# Patient Record
Sex: MALE | URBAN - METROPOLITAN AREA
[De-identification: names, ages, dates, MRNs, and addresses within clinical notes are randomized per-mention and may not be internally consistent; named-entity substitution may affect disease eponyms.]

---

## 2018-02-08 ENCOUNTER — OFFICE VISIT (OUTPATIENT)
Dept: INTERNAL MEDICINE | Facility: CLINIC | Age: 28
End: 2018-02-08
Payer: COMMERCIAL

## 2018-02-08 VITALS
SYSTOLIC BLOOD PRESSURE: 134 MMHG | RESPIRATION RATE: 18 BRPM | WEIGHT: 251.4 LBS | TEMPERATURE: 97.7 F | DIASTOLIC BLOOD PRESSURE: 82 MMHG | HEIGHT: 72 IN | BODY MASS INDEX: 34.05 KG/M2 | HEART RATE: 74 BPM | OXYGEN SATURATION: 99 %

## 2018-02-08 DIAGNOSIS — Z00.00 ROUTINE ADULT HEALTH MAINTENANCE: ICD-10-CM

## 2018-02-08 DIAGNOSIS — Z00.00 ROUTINE ADULT HEALTH MAINTENANCE: Primary | ICD-10-CM

## 2018-02-08 DIAGNOSIS — Z78.9 NO IMMUNIZATION HISTORY RECORD: ICD-10-CM

## 2018-02-08 LAB
ANTIBODY: NORMAL
HBV SURFACE AB SERPL IA-ACNC: >1000 M[IU]/ML

## 2018-02-08 NOTE — PROGRESS NOTES
PRIMARY CARE CENTER         HPI:       Aristides Brown is a 27 year old male who presents for the following  Patient presents with: Establish Care (Patient is here to establish a new PCP. ) and Physical (Patient is here for annual physical. )    Patient was just accepted to medical school, and is here for a physical exam and to review immunization history. He has no acute complaints today. 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 He is hoping to pursue either family medicine or general surgery.     He is getting regular exercise and eating a balanced diet, though admits this is sometimes challenging while in school. He denies any joint or muscle issues, chest pain, SOB, abdominal pain, n/v/d, constipation, fevers, or weight loss.         Health Maintenance Due   Topic Date Due     TETANUS IMMUNIZATION (SYSTEM ASSIGNED)  03/04/2009       Problem, Medication and Allergy Lists were   reviewed and are current.   There are no active problems to display for this patient.        No current outpatient prescriptions on file.       No Known Allergies  Patient is   a new patient to this clinic and so  I reviewed/updated the Past Medical History, the Family History and the Social History. , No past medical history on file.,   Family History     Problem (# of Occurrences) Relation (Name,Age of Onset)    CANCER (1) Paternal Grandfather    Hypertension (1) Father       and   Social History     Social History     Marital status:      Spouse name: N/A     Number of children: N/A     Years of education: N/A     Occupational History     Medical Student      [employer]     Social History Main Topics     Smoking status: Never Smoker     Smokeless tobacco: Never Used     Alcohol use 2.4 - 3.6 oz/week     4 - 6 Standard drinks or equivalent per week     Drug use: No     Sexual activity: Yes     Partners: Female     Birth control/ protection: Pill      Other Topics Concern     Not on file     Social History Narrative     No narrative on file            Review of Systems:   Review of Systems     Constitutional:  Negative for fever, chills, weight loss, weight gain, fatigue, decreased appetite, night sweats, recent stressors, height gain, height loss, post-operative complications, incisional pain, hallucinations, increased energy, hyperactivity and confused.   HENT:  Negative for ear pain, hearing loss, tinnitus, nosebleeds, trouble swallowing, hoarse voice, mouth sores, sore throat, ear discharge, tooth pain, gum tenderness, taste disturbance, smell disturbance, hearing aid, bleeding gums, dry mouth, sinus pain, sinus congestion and neck mass.    Eyes:  Negative for double vision, pain, redness, eye pain, decreased vision, eye watering, eye bulging, eye dryness, flashing lights, spots, floaters, strabismus, tunnel vision, jaundice and eye irritation.   Respiratory:   Negative for cough, hemoptysis, sputum production, shortness of breath, wheezing, sleep disturbances due to breathing, snores loudly, respiratory pain, dyspnea on exertion, cough disturbing sleep and postural dyspnea.    Cardiovascular:  Negative for chest pain, dyspnea on exertion, palpitations, orthopnea, claudication, leg swelling, fingers/toes turn blue, hypertension, hypotension, syncope, history of heart murmur, chest pain on exertion, chest pain at rest, pacemaker, few scattered varicosities, leg pain, sleep disturbances due to breathing, tachycardia, light-headedness, exercise intolerance and edema.   Gastrointestinal:  Negative for heartburn, nausea, vomiting, abdominal pain, diarrhea, constipation, blood in stool, melena, rectal pain, bloating, hemorrhoids, bowel incontinence, jaundice, rectal bleeding, coffee ground emesis and change in stool.   Genitourinary:  Negative for bladder incontinence, dysuria, urgency, hematuria, flank pain, difficulty urinating, nocturia, voiding less  "frequently, scrotal pain, ulcerations, penile discharge, male genitourinary complaint and reduced libido.   Musculoskeletal:  Negative for myalgias, back pain, joint swelling, arthralgias, stiffness, muscle cramps, neck pain, bone pain, muscle weakness and fracture.   Skin:  Negative for nail changes, itching, poor wound healing, rash, hair changes, skin changes, acne, warts, poor wound healing, scarring, flaky skin, Raynaud's phenomenon, sensitivity to sunlight and skin thickening.   Neurological:  Negative for dizziness, tingling, tremors, speech change, seizures, loss of consciousness, weakness, light-headedness, numbness, headaches, disturbances in coordination, extremity numbness, memory loss, difficulty walking and paralysis.   Endo/Heme:  Negative for anemia, swollen glands and bruises/bleeds easily.   Psychiatric/Behavioral:  Negative for depression, hallucinations, memory loss, decreased concentration, mood swings and panic attacks.    Endocrine:  Negative for altered temperature regulation, polyphagia, polydipsia, unwanted hair growth and change in facial hair.    I have personally reviewed and updated the complete ROS on the day of the visit.           Physical Exam:   /82  Pulse 74  Temp 97.7  F (36.5  C) (Oral)  Resp 18  Ht 1.836 m (6' 0.28\")  Wt 114 kg (251 lb 6.4 oz)  SpO2 99%  BMI 33.83 kg/m2  Body mass index is 33.83 kg/(m^2).  Vitals were reviewed       GENERAL APPEARANCE: healthy, alert and no distress     EYES: EOMI,  PERRL     HENT: ear canals and TM's normal and nose and mouth without ulcers or lesions     NECK: no adenopathy, no asymmetry, masses, or scars and thyroid normal to palpation     RESP: lungs clear to auscultation - no rales, rhonchi or wheezes     CV: regular rates and rhythm, normal S1 S2, no S3 or S4 and no murmur, click or rub     ABDOMEN:  soft, nontender, no HSM or masses and bowel sounds normal     MS: extremities normal- no gross deformities noted, no evidence " of inflammation in joints, FROM in all extremities.     SKIN: no suspicious lesions or rashes     NEURO: Normal strength and tone, sensory exam grossly normal, mentation intact and speech normal     PSYCH: mentation appears normal. and affect normal/bright        Results:      Results from the last 24 hoursNo results found for this or any previous visit (from the past 24 hour(s)).  Assessment and Plan     Aristides was seen today for establish care and physical.    Diagnoses and all orders for this visit:    Routine adult health maintenance  -     Hepatitis B Surface Antibody; Future  -     M Tuberculosis by Quantiferon; Future  -     Varicella Zoster Virus Antibody IgG; Future  -     Mumps Antibody IgG; Future  -     Rubella Antibody IgG Quantitative; Future  -     Rubeola Antibody IgG; Future  -     TDAP VACCINE (BOOSTRIX)    No immunization history record  -     Hepatitis B Surface Antibody; Future  -     M Tuberculosis by Quantiferon; Future  -     Varicella Zoster Virus Antibody IgG; Future  -     Mumps Antibody IgG; Future  -     Rubella Antibody IgG Quantitative; Future  -     Rubeola Antibody IgG; Future  -     TDAP VACCINE (BOOSTRIX)      Options for treatment and follow-up care were reviewed with the patient. Aristides Brown engaged in the decision making process and verbalized understanding of the options discussed and agreed with the final plan.    Kristi Morris MD  Feb 8, 2018    Plan of care discussed with Dr. Brown.     Attestation:  I, Caty Doshi, saw this patient with the resident and agree with the resident s findings and plan of care as documented in the resident s note.      Caty Doshi MD

## 2018-02-08 NOTE — MR AVS SNAPSHOT
After Visit Summary   2/8/2018    Aristides Brown    MRN: [id number]           Patient Information     Date Of Birth          1990        Visit Information        Provider Department      2/8/2018 10:10 AM Kristi Morris MD [facility]        Today's Diagnoses     Routine adult health maintenance    -  1    No immunization history record          Care Instructions    Primary Care Center: [phone]     Primary Care Center Medication Refill Request Information:  * Please contact your pharmacy regarding ANY request for medication refills.  ** [facility] Prescription Fax = [phone]  * Please allow 3 business days for routine medication refills.  * Please allow 5 business days for controlled substance medication refills.     Primary Care Center Test Result notification information:  *You will be notified with in 7-10 days of your appointment day regarding the results of your test.  If you are on MyChart you will be notified as soon as the provider has reviewed the results and signed off on them.    What we talked about today:  - Labs for vaccine titers today  - Tdap today  - Results will all be available on MyChart          Follow-ups after your visit        Future tests that were ordered for you today     Open Future Orders        Priority Expected Expires Ordered    Hepatitis B Surface Antibody Routine  2/9/2019 2/8/2018    M Tuberculosis by Quantiferon Routine 2/8/2018 2/8/2019 2/8/2018    Varicella Zoster Virus Antibody IgG Routine  2/9/2019 2/8/2018    Mumps Antibody IgG Routine 2/8/2018 2/8/2019 2/8/2018    Rubella Antibody IgG Quantitative Routine 2/8/2018 2/8/2019 2/8/2018    Rubeola Antibody IgG Routine 2/8/2018 2/8/2019 2/8/2018            Who to contact     Please call your clinic at [phone] to:    Ask questions about your health    Make or cancel appointments    Discuss your medicines    Learn about your test results    Speak to your doctor   If you have  "compliments or concerns about an experience at your clinic, or if you wish to file a complaint, please contact [facility] Patient Relations at [phone] or email us at [email or web address]         Additional Information About Your Visit        MyChart Information     [employer] gives you secure access to your electronic health record. If you see a primary care provider, you can also send messages to your care team and make appointments. If you have questions, please call your primary care clinic.  If you do not have a primary care provider, please call [phone] and they will assist you.      [employer] is an electronic gateway that provides easy, online access to your medical records. With [employer], you can request a clinic appointment, read your test results, renew a prescription or communicate with your care team.     To access your existing account, please contact your [facility] Clinic or call [phone] for assistance.        Care EveryWhere ID     This is your Care EveryWhere ID. This could be used by other organizations to access your [place] medical records  [id number]        Your Vitals Were     Pulse Temperature Respirations Height Pulse Oximetry BMI (Body Mass Index)    74 97.7  F (36.5  C) (Oral) 18 1.836 m (6' 0.28\") 99% 33.83 kg/m2       Blood Pressure from Last 3 Encounters:   02/08/18 134/82    Weight from Last 3 Encounters:   02/08/18 114 kg (251 lb 6.4 oz)              We Performed the Following     TDAP VACCINE (BOOSTRIX)        Primary Care Provider    None Specified       No primary provider on file.        Equal Access to Services     [facility] CenterWYATT : Hadii lanny morenoo Sojulio, waaxda luqadaha, qaybta kaalmada madi, wilfrido pal . So [facility] [phone].    ATENCIÓN: Si habla español, tiene a vitale disposición servicios gratuitos de asistencia lingüística. Llame al [phone].    We comply with " applicable federal civil rights laws and [place] laws. We do not discriminate on the basis of race, color, national origin, age, disability, sex, sexual orientation, or gender identity.            Thank you!     Thank you for choosing [facility]  for your care. Our goal is always to provide you with excellent care. Hearing back from our patients is one way we can continue to improve our services. Please take a few minutes to complete the written survey that you may receive in the mail after your visit with us. Thank you!             Your Updated Medication List - Protect others around you: Learn how to safely use, store and throw away your medicines at www.disposemymeds.org.      Notice  As of 2/8/2018 11:09 AM    You have not been prescribed any medications.

## 2018-02-08 NOTE — PATIENT INSTRUCTIONS
[facility]: [phone]     [facility] Medication Refill Request Information:  * Please contact your pharmacy regarding ANY request for medication refills.  ** [facility] Prescription Fax = [phone]  * Please allow 3 business days for routine medication refills.  * Please allow 5 business days for controlled substance medication refills.     [facility] Test Result notification information:  *You will be notified with in 7-10 days of your appointment day regarding the results of your test.  If you are on [employer] you will be notified as soon as the provider has reviewed the results and signed off on them.    What we talked about today:  - Labs for vaccine titers today  - Tdap today  - Results will all be available on [employer]

## 2018-02-09 LAB
M TB TUBERC IFN-G BLD QL: NEGATIVE
M TB TUBERC IFN-G/MITOGEN IGNF BLD: 0.01 IU/ML
MEV IGG SER QL IA: 3 AI (ref 0–0.8)
MUV IGG SER QL IA: 0.8 AI (ref 0–0.8)
RUBV IGG SERPL IA-ACNC: 72 IU/ML
VZV IGG SER QL IA: 1.7 AI (ref 0–0.8)

## 2018-02-14 ENCOUNTER — ALLIED HEALTH/NURSE VISIT (OUTPATIENT)
Dept: INTERNAL MEDICINE | Facility: CLINIC | Age: 28
End: 2018-02-14
Payer: COMMERCIAL

## 2018-02-14 DIAGNOSIS — Z28.39 IMMUNIZATION DEFICIENCY: Primary | ICD-10-CM

## 2018-02-14 DIAGNOSIS — Z23 NEED FOR MEASLES-MUMPS-RUBELLA (MMR) VACCINE: Primary | ICD-10-CM

## 2018-02-14 NOTE — MR AVS SNAPSHOT
After Visit Summary   2/14/2018    Aristides Brown    MRN: [id number]           Patient Information     Date Of Birth          1990        Visit Information        Provider Department      2/14/2018 11:00 AM Nurse, Nissa [facility]        Today's Diagnoses     Need for measles-mumps-rubella (MMR) vaccine    -  1       Follow-ups after your visit        Who to contact     Please call your clinic at [phone] to:    Ask questions about your health    Make or cancel appointments    Discuss your medicines    Learn about your test results    Speak to your doctor            Additional Information About Your Visit        MyChart Information     [employer] gives you secure access to your electronic health record. If you see a primary care provider, you can also send messages to your care team and make appointments. If you have questions, please call your primary care clinic.  If you do not have a primary care provider, please call [phone] and they will assist you.      [employer] is an electronic gateway that provides easy, online access to your medical records. With [employer], you can request a clinic appointment, read your test results, renew a prescription or communicate with your care team.     To access your existing account, please contact your [facility] or call [phone] for assistance.        Care EveryWhere ID     This is your Care EveryWhere ID. This could be used by other organizations to access your [place] medical records  [id number]         Blood Pressure from Last 3 Encounters:   02/08/18 134/82    Weight from Last 3 Encounters:   02/08/18 114 kg (251 lb 6.4 oz)              We Performed the Following     MMR VACCINE, LIVE        Primary Care Provider    None Specified       No primary provider on file.        Equal Access to Services     YENNY HERRERA : frida Krause qaybta kaalmada adeegyada,  wilfrido gloverayaan trevizo'aan ah. So [facility] [phone].    ATENCIÓN: Si habla vargasañol, tiene a vitale disposición servicios gratuitos de asistencia lingüística. Piero al [phone].    We comply with applicable federal civil rights laws and [place] laws. We do not discriminate on the basis of race, color, national origin, age, disability, sex, sexual orientation, or gender identity.            Thank you!     Thank you for choosing [facility]  for your care. Our goal is always to provide you with excellent care. Hearing back from our patients is one way we can continue to improve our services. Please take a few minutes to complete the written survey that you may receive in the mail after your visit with us. Thank you!             Your Updated Medication List - Protect others around you: Learn how to safely use, store and throw away your medicines at www.disposemymeds.org.      Notice  As of 2/14/2018  2:20 PM    You have not been prescribed any medications.

## 2018-02-14 NOTE — NURSING NOTE
Chief Complaint   Patient presents with     Imm/Inj     Patient is here for MMR booster     Aristides Brown comes into clinic today at the request of Dr. Caty Hill for MMR booster.    Administered MMR vaccine (see Immunizations in Chart Review). Patient tolerated well.        This service provided today was under the direct supervision of Dr. Octavio Andino, who was available if needed.    Sudhir Palacios CMA at 11:19 AM on 2/14/2018

## 2018-02-21 ASSESSMENT — ENCOUNTER SYMPTOMS
NAIL CHANGES: 0
POLYPHAGIA: 0
DIZZINESS: 0
NECK PAIN: 0
WEIGHT GAIN: 0
HOARSE VOICE: 0
WEIGHT LOSS: 0
FEVER: 0
EXERCISE INTOLERANCE: 0
PALPITATIONS: 0
SWOLLEN GLANDS: 0
MUSCLE CRAMPS: 0
BLOOD IN STOOL: 0
VOMITING: 0
WEAKNESS: 0
NUMBNESS: 0
FLANK PAIN: 0
DIFFICULTY URINATING: 0
RECTAL PAIN: 0
HYPOTENSION: 0
DECREASED CONCENTRATION: 0
MUSCLE WEAKNESS: 0
INSOMNIA: 0
DOUBLE VISION: 0
LEG PAIN: 0
WHEEZING: 0
SEIZURES: 0
ARTHRALGIAS: 0
BRUISES/BLEEDS EASILY: 0
HEMOPTYSIS: 0
COUGH DISTURBING SLEEP: 0
CONSTIPATION: 0
NECK MASS: 0
SKIN CHANGES: 0
INCREASED ENERGY: 0
EYE WATERING: 0
RECTAL BLEEDING: 0
CHILLS: 0
SNORES LOUDLY: 0
DYSPNEA ON EXERTION: 0
SORE THROAT: 0
SLEEP DISTURBANCES DUE TO BREATHING: 0
HYPERTENSION: 0
DYSURIA: 0
ABDOMINAL PAIN: 0
LEG SWELLING: 0
DECREASED APPETITE: 0
BLOATING: 0
TASTE DISTURBANCE: 0
CLAUDICATION: 0
MEMORY LOSS: 0
TREMORS: 0
EYE REDNESS: 0
EXTREMITY NUMBNESS: 0
BACK PAIN: 0
MYALGIAS: 0
SINUS PAIN: 0
DISTURBANCES IN COORDINATION: 0
POSTURAL DYSPNEA: 0
RESPIRATORY PAIN: 0
JOINT SWELLING: 0
FATIGUE: 0
POOR WOUND HEALING: 0
STIFFNESS: 0
PARALYSIS: 0
POLYDIPSIA: 0
LOSS OF CONSCIOUSNESS: 0
NAUSEA: 0
EYE IRRITATION: 0
HEARTBURN: 0
DEPRESSION: 0
TACHYCARDIA: 0
HEMATURIA: 0
LIGHT-HEADEDNESS: 0
NIGHT SWEATS: 0
COUGH: 0
HALLUCINATIONS: 0
SPUTUM PRODUCTION: 0
SPEECH CHANGE: 0
SMELL DISTURBANCE: 0
DIARRHEA: 0
SYNCOPE: 0
HEADACHES: 0
BOWEL INCONTINENCE: 0
ALTERED TEMPERATURE REGULATION: 0
EYE PAIN: 0
TROUBLE SWALLOWING: 0
NERVOUS/ANXIOUS: 0
SHORTNESS OF BREATH: 0
SINUS CONGESTION: 0
ORTHOPNEA: 0
PANIC: 0
JAUNDICE: 0
TINGLING: 0

## 2018-03-19 DIAGNOSIS — Z28.39 IMMUNIZATION DEFICIENCY: ICD-10-CM

## 2018-03-20 LAB — MUV IGG SER QL IA: 2.2 AI (ref 0–0.8)

## 2020-03-11 ENCOUNTER — HEALTH MAINTENANCE LETTER (OUTPATIENT)
Age: 30
End: 2020-03-11

## 2020-05-20 LAB
INDURATION: NORMAL
TB SKIN TEST: NEGATIVE

## 2021-01-03 ENCOUNTER — HEALTH MAINTENANCE LETTER (OUTPATIENT)
Age: 31
End: 2021-01-03

## 2021-04-25 ENCOUNTER — HEALTH MAINTENANCE LETTER (OUTPATIENT)
Age: 31
End: 2021-04-25

## 2021-05-10 ENCOUNTER — NURSE ONLY (OUTPATIENT)
Dept: LAB | Age: 31
End: 2021-05-10

## 2021-05-10 ENCOUNTER — OFFICE VISIT (OUTPATIENT)
Dept: FAMILY MEDICINE CLINIC | Age: 31
End: 2021-05-10
Payer: COMMERCIAL

## 2021-05-10 VITALS
RESPIRATION RATE: 16 BRPM | OXYGEN SATURATION: 98 % | WEIGHT: 261.1 LBS | TEMPERATURE: 98.2 F | HEIGHT: 72 IN | DIASTOLIC BLOOD PRESSURE: 74 MMHG | SYSTOLIC BLOOD PRESSURE: 126 MMHG | BODY MASS INDEX: 35.36 KG/M2 | HEART RATE: 78 BPM

## 2021-05-10 DIAGNOSIS — E01.0 THYROMEGALY: ICD-10-CM

## 2021-05-10 DIAGNOSIS — R53.83 FATIGUE, UNSPECIFIED TYPE: ICD-10-CM

## 2021-05-10 DIAGNOSIS — R53.83 FATIGUE, UNSPECIFIED TYPE: Primary | ICD-10-CM

## 2021-05-10 DIAGNOSIS — Z11.1 VISIT FOR TB SKIN TEST: ICD-10-CM

## 2021-05-10 DIAGNOSIS — Z23 NEED FOR MENINGITIS VACCINATION: ICD-10-CM

## 2021-05-10 DIAGNOSIS — Z00.00 ROUTINE HEALTH MAINTENANCE: ICD-10-CM

## 2021-05-10 LAB
BASOPHILS # BLD: 0.8 %
BASOPHILS ABSOLUTE: 0 THOU/MM3 (ref 0–0.1)
CHOLESTEROL, TOTAL: 171 MG/DL (ref 100–199)
EOSINOPHIL # BLD: 1.5 %
EOSINOPHILS ABSOLUTE: 0.1 THOU/MM3 (ref 0–0.4)
ERYTHROCYTE [DISTWIDTH] IN BLOOD BY AUTOMATED COUNT: 12.3 % (ref 11.5–14.5)
ERYTHROCYTE [DISTWIDTH] IN BLOOD BY AUTOMATED COUNT: 39.8 FL (ref 35–45)
HCT VFR BLD CALC: 47.7 % (ref 42–52)
HDLC SERPL-MCNC: 42 MG/DL
HEMOGLOBIN: 15.4 GM/DL (ref 14–18)
IMMATURE GRANS (ABS): 0.01 THOU/MM3 (ref 0–0.07)
IMMATURE GRANULOCYTES: 0.2 %
LDL CHOLESTEROL CALCULATED: 112 MG/DL
LYMPHOCYTES # BLD: 26.5 %
LYMPHOCYTES ABSOLUTE: 1.6 THOU/MM3 (ref 1–4.8)
MAGNESIUM: 1.9 MG/DL (ref 1.6–2.4)
MCH RBC QN AUTO: 28.4 PG (ref 26–33)
MCHC RBC AUTO-ENTMCNC: 32.3 GM/DL (ref 32.2–35.5)
MCV RBC AUTO: 88 FL (ref 80–94)
MONOCYTES # BLD: 8.5 %
MONOCYTES ABSOLUTE: 0.5 THOU/MM3 (ref 0.4–1.3)
NUCLEATED RED BLOOD CELLS: 0 /100 WBC
PLATELET # BLD: 307 THOU/MM3 (ref 130–400)
PMV BLD AUTO: 9 FL (ref 9.4–12.4)
RBC # BLD: 5.42 MILL/MM3 (ref 4.7–6.1)
SEG NEUTROPHILS: 62.5 %
SEGMENTED NEUTROPHILS ABSOLUTE COUNT: 3.8 THOU/MM3 (ref 1.8–7.7)
T4 FREE: 1.22 NG/DL (ref 0.93–1.76)
TRIGL SERPL-MCNC: 85 MG/DL (ref 0–199)
TSH SERPL DL<=0.05 MIU/L-ACNC: 2.46 UIU/ML (ref 0.4–4.2)
VITAMIN D 25-HYDROXY: 30 NG/ML (ref 30–100)
WBC # BLD: 6 THOU/MM3 (ref 4.8–10.8)

## 2021-05-10 PROCEDURE — 90471 IMMUNIZATION ADMIN: CPT | Performed by: FAMILY MEDICINE

## 2021-05-10 PROCEDURE — 90734 MENACWYD/MENACWYCRM VACC IM: CPT | Performed by: FAMILY MEDICINE

## 2021-05-10 PROCEDURE — 99203 OFFICE O/P NEW LOW 30 MIN: CPT | Performed by: STUDENT IN AN ORGANIZED HEALTH CARE EDUCATION/TRAINING PROGRAM

## 2021-05-10 SDOH — ECONOMIC STABILITY: FOOD INSECURITY: WITHIN THE PAST 12 MONTHS, YOU WORRIED THAT YOUR FOOD WOULD RUN OUT BEFORE YOU GOT MONEY TO BUY MORE.: NEVER TRUE

## 2021-05-10 SDOH — ECONOMIC STABILITY: FOOD INSECURITY: WITHIN THE PAST 12 MONTHS, THE FOOD YOU BOUGHT JUST DIDN'T LAST AND YOU DIDN'T HAVE MONEY TO GET MORE.: NEVER TRUE

## 2021-05-10 ASSESSMENT — ENCOUNTER SYMPTOMS
ABDOMINAL PAIN: 0
SHORTNESS OF BREATH: 0
CONSTIPATION: 0
VOMITING: 0
NAUSEA: 0
DIARRHEA: 0
COUGH: 0
SORE THROAT: 0

## 2021-05-10 ASSESSMENT — PATIENT HEALTH QUESTIONNAIRE - PHQ9
SUM OF ALL RESPONSES TO PHQ QUESTIONS 1-9: 0
SUM OF ALL RESPONSES TO PHQ QUESTIONS 1-9: 0

## 2021-05-10 NOTE — PROGRESS NOTES
Health Maintenance Due   Topic Date Due    Hepatitis C screen  Never done    Varicella vaccine (1 of 2 - 2-dose childhood series) Never done    HIV screen  Never done    COVID-19 Vaccine (1) Never done    DTaP/Tdap/Td vaccine (1 - Tdap) Never done

## 2021-05-10 NOTE — PROGRESS NOTES
S: 28 y.o. male with   Chief Complaint   Patient presents with    New Patient     establish    Immunizations     TB, meningicoccal         Here to establish care    No past medical history    Needs some vaccines and a tb test - applying for a PMR rotation and needs this proof    Thyroid enlarged - mom had thyroid    ldl elevated in the past    Fatigued also       BP Readings from Last 3 Encounters:   05/10/21 126/74     Wt Readings from Last 3 Encounters:   05/10/21 261 lb 1.6 oz (118.4 kg)           O: VS:   Vitals:    05/10/21 0859   BP: 126/74   Site: Left Upper Arm   Position: Sitting   Cuff Size: Large Adult   Pulse: 78   Resp: 16   Temp: 98.2 °F (36.8 °C)   TempSrc: Oral   SpO2: 98%   Weight: 261 lb 1.6 oz (118.4 kg)   Height: 6' (1.829 m)     Body mass index is 35.41 kg/m². Lab Results   Component Value Date    WBC 6.0 05/10/2021    HGB 15.4 05/10/2021    HCT 47.7 05/10/2021     05/10/2021    CHOL 171 05/10/2021    TRIG 85 05/10/2021    HDL 42 05/10/2021    LDLCALC 112 05/10/2021    TSH 2.460 05/10/2021    MG 1.9 05/10/2021    VITD25 30 05/10/2021       No results found. Diagnosis Orders   1. Fatigue, unspecified type  TSH    T4, Free    CBC With Auto Differential    Lipid Panel    Vitamin D 25 Hydroxy    Magnesium   2. Thyromegaly  TSH    T4, Free   3. Routine health maintenance  Quantiferon (R) TB Gold, (Incubated)   4. Need for meningitis vaccination  Meningococcal MCV4O (age 1m-47y) IM (Alvarez Olmedo)   5. 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     Orders Placed:  Orders Placed This Encounter   Procedures    Quantiferon (R) TB Gold, (Incubated)    Meningococcal MCV4O (age 1m-47y) IM (MENVEO)    TSH    T4, Free    CBC With Auto Differential    Lipid Panel    Vitamin D 25 Hydroxy    Magnesium     Medications Prescribed:  Orders Placed This Encounter   Medications    DISCONTD: tuberculin 5 UNIT/0.1ML injection     Sig: Inject 0.1 mLs into the skin once for 1 dose     Dispense:  0.1 mL     Refill:  0       No future appointments. Health Maintenance Due   Topic Date Due    Hepatitis C screen  Never done    Varicella vaccine (1 of 2 - 2-dose childhood series) Never done    HIV screen  Never done    COVID-19 Vaccine (1) Never done    DTaP/Tdap/Td vaccine (1 - Tdap) Never done         Attending Physician Statement  I have discussed the case, including pertinent history and exam findings with the resident. [id number] agree with the documented assessment and plan as documented by the resident.   GE modifier added to this encounter      Maranda Singh DO 5/10/2021 6:11 PM

## 2021-05-10 NOTE — PROGRESS NOTES
Angela Romeo is a 28 y.o. male who presents today for:  Chief Complaint   Patient presents with    New Patient     establish    Immunizations     TB, meningicoccal         Goals    None         HPI:     HPI   69-year-old male with no PMH here to establish care and get vaccines. He reports feeling very fatigued, especially worsening in the last 2 weeks. Concerned it could be due to his thyroid and feels that it may be enlarged, but does not have any other symptoms. Unsure family history of thyroid disease -- thinks mom may have hypothyroidism. Otherwise feeling well and no complaints. States he had some labs drawn about a year ago and his LDL was slightly high. Would like it rechecked. PMH: none  PSH: none  Social: ETOH 6 drinks/week. No tobacco or drug use. Full-time medical student. 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 Unsure when his last meningococcal vaccine was. No current outpatient medications on file. No current facility-administered medications for this visit. Social Needs   Food insecurity    Worry: Never true    Inability: Never true       Health Maintenance   Topic Date Due    Hepatitis C screen  Never done    Varicella vaccine (1 of 2 - 2-dose childhood series) Never done    HIV screen  Never done    COVID-19 Vaccine (1) Never done    DTaP/Tdap/Td vaccine (1 - Tdap) Never done    Flu vaccine (Season Ended) 09/01/2021    Hepatitis A vaccine  Aged Out    Hepatitis B vaccine  Aged Out    Hib vaccine  Aged Out    Meningococcal (ACWY) vaccine  Aged Out    Pneumococcal 0-64 years Vaccine  Aged Out       ROS:      Review of Systems   Constitutional: Positive for fatigue. Negative for activity change, chills and fever. HENT: Negative for congestion and sore throat.     Eyes: Negative for visual disturbance. Respiratory: Negative for cough and shortness of breath. Cardiovascular: Negative for chest pain and palpitations. Gastrointestinal: Negative for abdominal pain, constipation, diarrhea, nausea and vomiting. Genitourinary: Negative for dysuria. Musculoskeletal: Negative for arthralgias. Skin: Negative for rash. Neurological: Negative for dizziness, weakness, light-headedness and headaches. Objective:     Vitals:    05/10/21 0859   BP: 126/74   Site: Left Upper Arm   Position: Sitting   Cuff Size: Large Adult   Pulse: 78   Resp: 16   Temp: 98.2 °F (36.8 °C)   TempSrc: Oral   SpO2: 98%   Weight: 261 lb 1.6 oz (118.4 kg)   Height: 6' (1.829 m)       Body mass index is 35.41 kg/m². Wt Readings from Last 3 Encounters:   05/10/21 261 lb 1.6 oz (118.4 kg)     BP Readings from Last 3 Encounters:   05/10/21 126/74       Physical Exam  Vitals signs reviewed. Constitutional:       General: He is not in acute distress. Appearance: Normal appearance. HENT:      Head: Normocephalic and atraumatic. Right Ear: External ear normal.      Left Ear: External ear normal.      Nose: Nose normal.      Mouth/Throat:      Mouth: Mucous membranes are moist.   Eyes:      General:         Right eye: No discharge. Left eye: No discharge. Conjunctiva/sclera: Conjunctivae normal.   Neck:      Musculoskeletal: Normal range of motion. Thyroid: Thyromegaly present. No thyroid mass or thyroid tenderness. Cardiovascular:      Rate and Rhythm: Normal rate and regular rhythm. Pulses: Normal pulses. Heart sounds: Normal heart sounds. No murmur. Pulmonary:      Effort: Pulmonary effort is normal. No respiratory distress. Breath sounds: Normal breath sounds. No wheezing, rhonchi or rales. Abdominal:      General: Abdomen is flat. Bowel sounds are normal. There is no distension. Palpations: Abdomen is soft. Tenderness:  There is no abdominal tenderness. Musculoskeletal: Normal range of motion. Skin:     General: Skin is warm and dry. Capillary Refill: Capillary refill takes less than 2 seconds. Neurological:      General: No focal deficit present. Mental Status: He is alert. Psychiatric:         Mood and Affect: Mood normal.         Behavior: Behavior normal.         Assessment / Plan:     1. Fatigue, unspecified type  - TSH; Future  - T4, Free; Future  - CBC With Auto Differential; Future  - Lipid Panel; Future  - Vitamin D 25 Hydroxy; Future  - Magnesium; Future    2. Thyromegaly  - TSH; Future  - T4, Free; Future    3. Routine health maintenance  - Quantiferon (R) TB Gold, (Incubated); Future  - Meningococcal MCV4O (age 1m-47y) IM (MENVEO); Future         Return in about 6 months (around 11/10/2021) for follow up. Medications Prescribed:  No orders of the defined types were placed in this encounter. No future appointments. Patient given educational materials - see patient instructions. Discussed use, benefit, and side effects of prescribed medications. All patient questions answered. Patient voiced understanding. Reviewed health maintenance. Instructed to continue current medications, diet and exercise. Patient agreed with treatment plan. Follow up as directed.      Electronically signed by Donal Hendricks MD on 5/10/2021 at 2:36 PM

## 2021-05-10 NOTE — PROGRESS NOTES
After obtaining consent, and per orders of Dr. Alfa James, injection of Menvo was given IM in Left deltoid by Jayjay Muir. Patient tolerated well and was instructed to report any adverse reaction to me immediately. Jah Bradley left office with no apparent reaction.     Immunizations Administered     Name Date Dose Route    Meningococcal MCV4O (Menveo) 5/10/2021 0.5 mL Intramuscular    Site: Deltoid- Left    Lot: [id number]    NDC: 96718-508-31

## 2021-05-11 NOTE — RESULT ENCOUNTER NOTE
Diana Lu. Vit D is a little low. Start OTC vit D supplementation of 2000 IU daily. Otherwise, the rest of your labs look good. Will let you know when we receive your quantiferon test. Let me know if you have any questions or concerns. Thanks!

## 2021-05-13 LAB
QUANTI TB GOLD PLUS: NEGATIVE
QUANTI TB1 MINUS NIL: 0 IU/ML (ref 0–0.34)
QUANTI TB2 MINUS NIL: 0 IU/ML (ref 0–0.34)
QUANTIFERON MITOGEN MINUS NIL: 4.84 IU/ML
QUANTIFERON NIL: 0.02 IU/ML

## 2021-07-20 ENCOUNTER — OFFICE VISIT (OUTPATIENT)
Dept: SURGERY | Age: 31
End: 2021-07-20
Payer: COMMERCIAL

## 2021-07-20 VITALS
OXYGEN SATURATION: 96 % | WEIGHT: 269.2 LBS | HEIGHT: 72 IN | DIASTOLIC BLOOD PRESSURE: 82 MMHG | SYSTOLIC BLOOD PRESSURE: 122 MMHG | BODY MASS INDEX: 36.46 KG/M2 | HEART RATE: 88 BPM | TEMPERATURE: 97.3 F | RESPIRATION RATE: 16 BRPM

## 2021-07-20 DIAGNOSIS — L05.91 PILONIDAL CYST: Primary | ICD-10-CM

## 2021-07-20 PROCEDURE — 99204 OFFICE O/P NEW MOD 45 MIN: CPT | Performed by: SURGERY

## 2021-07-20 ASSESSMENT — ENCOUNTER SYMPTOMS
ALLERGIC/IMMUNOLOGIC NEGATIVE: 1
GASTROINTESTINAL NEGATIVE: 1
EYES NEGATIVE: 1
RESPIRATORY NEGATIVE: 1

## 2021-07-20 NOTE — PROGRESS NOTES
Adele Russo. [facility], [address] [place] [address]  [phone]  New Patient Evaluation in Office    Pt Name: Laurie Medico  Date of Birth 1990   Today's Date: 7/20/2021  Medical Record Number: [id number]  Referring Provider: No ref. provider found  Primary Care Provider: Sheree Lucio MD  Chief Complaint   Patient presents with   [employer] Surgical Consult     new patient--self ref for pilonidal cyst     ASSESSMENT       Diagnosis Orders   1. Pilonidal cyst  EXCISION PILONIDAL CYST / SINUS   History reviewed. No pertinent past medical history. PLANS      1. Schedule Rafael for pilonidal cystectomy  2. Potential diagnostic and therapeutic options as well as alternatives were discussed with the patient. The potential for recurrence, infection, bleeding and an open wound were discussed. Specific reinforcement about appropriate wound care was discussed. The patient was given an opportunity to ask questions and has agreed to proceed with the procedure. 3. Status: outpatient  4. Planned anesthesia: general  5. He will undergo pre-operative clearance per anesthesia guidelines with risk factors listed under the past medical history diagnosis & problem list.     Leatha Elias is a 28 y.o. male seen in the consultation for evaluation of a pilonidal cyst. Lesion is located in the midline gluteal fold. It began 2 weeks ago and has progressed to a point and plateaued. He has had 2 flare ups since it first began, and has had purulent drainage with removal of a hair from site. Symptoms include discharge, redness and pain and have waxed and waned but are better overall. He has associated symptoms of spontaneous drainage, pain. He does not have previous history of cutaneous abscesses. He does not have diabetes, immunosuppression or history of MRSA. No prior therapy other than keeping area clean. Pain is controlled without any medications. .  Past Medical History  History reviewed. No pertinent past medical history. Past Surgical History  History reviewed. No pertinent surgical history. Medications  No current outpatient medications on file. No current facility-administered medications for this visit. Allergies  has No Known Allergies. Family History  family history includes Diabetes in his father; High Blood Pressure in his father. Social History   reports that he has never smoked. He has never used smokeless tobacco. He reports current alcohol use. He reports that he does not use drugs. Health Screening Exams  Health Maintenance   Topic Date Due    Hepatitis C screen  Never done    Varicella vaccine (1 of 2 - 2-dose childhood series) Never done    HIV screen  Never done    DTaP/Tdap/Td vaccine (1 - Tdap) Never done    Flu vaccine (1) 09/01/2021    COVID-19 Vaccine  Completed    Hepatitis A vaccine  Aged Out    Hepatitis B vaccine  Aged Out    Hib vaccine  Aged Out    Meningococcal (ACWY) vaccine  Aged Out    Pneumococcal 0-64 years Vaccine  Aged Out     Review of Systems  Review of Systems   Constitutional: Negative. HENT: Negative. Eyes: Negative. Respiratory: Negative. Cardiovascular: Negative. Gastrointestinal: Negative. Endocrine: Negative. Genitourinary: Negative. Musculoskeletal: Negative. Skin: Negative. Allergic/Immunologic: Negative. Neurological: Negative. Hematological: Negative. Psychiatric/Behavioral: Negative. OBJECTIVE    VITALS:  height is 6' (1.829 m) and weight is 269 lb 3.2 oz (122.1 kg). His temporal temperature is 97.3 °F (36.3 °C). His blood pressure is 122/82 and his pulse is 88. His respiration is 16 and oxygen saturation is 96%. Pain Score:   4 Body mass index is 36.51 kg/m². CONSTITUTIONAL: Alert and oriented times 3, no acute distress and cooperative to examination with proper mood and affect.   SKIN: Skin color, texture, turgor normal. No rashes or lesions. LYMPH: no cervical nodes, no inguinal nodes  HEENT: Head is normocephalic, atraumatic. EOMI, PERRLA. NECK: Supple, symmetrical, trachea midline, no adenopathy, thyroid symmetric, not enlarged and no tenderness, skin normal.  CHEST/LUNGS: chest symmetric with normal A/P diameter, normal respiratory rate and rhythm, lungs clear to auscultation without wheezes, rales or rhonchi. No accessory muscle use. Scars None   CARDIOVASCULAR: Heart sounds are normal.  Regular rate and rhythm without murmur, gallop or rub. Normal S1 and S2. .  Carotid and femoral pulses 2+/4 and equal bilaterally. ABDOMEN: Normal shape. No scar(s) present. Normal bowel sounds. No bruits. soft, nontender, nondistended, no masses or organomegaly. no evidence of hernia. Percussion: Normal without hepatosplenomegally. RECTAL: 1 cm area of erythema at superior margin of gluteal fold. No active drainage present. NEUROLOGIC: There are no focalizing motor or sensory deficits. CN II-XII are grossly intact. Crystal Pura EXTREMITIES: no cyanosis, no clubbing and no edema. Thank you for the interesting evaluation. Further recommendations as listed above.        Electronically signed by Tre Valadez MD on 7/20/2021 at 10:59 AM

## 2021-07-20 NOTE — [place]  [address] [address] [address]  [phone]     Pt Name: Ham Whitlock  Medical Record Number: [id number]  Date of Birth 1990   Today's Date: 7/20/2021    Haris Rosas was seen in consultation in the office today. My assessment and plans are listed below. ASSESSMENT     Diagnosis Orders   1. Pilonidal cyst  EXCISION PILONIDAL CYST / SINUS     Past Medical History   History reviewed. No pertinent past medical history. PLANS   1. Schedule Rafael for pilonidal cystectomy  2. Potential diagnostic and therapeutic options as well as alternatives were discussed with the patient. The potential for recurrence, infection, bleeding and an open wound were discussed. Specific reinforcement about appropriate wound care was discussed. The patient was given an opportunity to ask questions and has agreed to proceed with the procedure. 3. Status: outpatient  4. Planned anesthesia: general  5. He will undergo pre-operative clearance per anesthesia guidelines with risk factors listed under the past medical history diagnosis & problem list.         If I can provide any additional assistance or you have any concerns, please feel free to contact me. Thank you for allowing to participate in the care of your patients. Sincerely,      Nhung Fine.  Rosanne Starks

## 2021-07-21 ENCOUNTER — ANESTHESIA EVENT (OUTPATIENT)
Dept: OPERATING ROOM | Age: 31
End: 2021-07-21
Payer: COMMERCIAL

## 2021-07-21 ENCOUNTER — HOSPITAL ENCOUNTER (OUTPATIENT)
Age: 31
Setting detail: OUTPATIENT SURGERY
Discharge: HOME | End: 2021-07-21
Attending: SURGERY | Admitting: SURGERY
Payer: COMMERCIAL

## 2021-07-21 ENCOUNTER — ANESTHESIA (OUTPATIENT)
Dept: OPERATING ROOM | Age: 31
End: 2021-07-21
Payer: COMMERCIAL

## 2021-07-21 VITALS
TEMPERATURE: 98 F | BODY MASS INDEX: 35.89 KG/M2 | HEIGHT: 72 IN | DIASTOLIC BLOOD PRESSURE: 76 MMHG | RESPIRATION RATE: 14 BRPM | OXYGEN SATURATION: 96 % | SYSTOLIC BLOOD PRESSURE: 128 MMHG | HEART RATE: 90 BPM | WEIGHT: 265 LBS

## 2021-07-21 VITALS
RESPIRATION RATE: 9 BRPM | SYSTOLIC BLOOD PRESSURE: 118 MMHG | OXYGEN SATURATION: 99 % | DIASTOLIC BLOOD PRESSURE: 73 MMHG

## 2021-07-21 DIAGNOSIS — G89.18 ACUTE POSTOPERATIVE PAIN: Primary | ICD-10-CM

## 2021-07-21 PROCEDURE — 2500000003 HC RX 250 WO HCPCS: Performed by: SURGERY

## 2021-07-21 PROCEDURE — 7100000011 HC PHASE II RECOVERY - ADDTL 15 MIN: Performed by: SURGERY

## 2021-07-21 PROCEDURE — 2500000003 HC RX 250 WO HCPCS: Performed by: NURSE ANESTHETIST, CERTIFIED REGISTERED

## 2021-07-21 PROCEDURE — 6360000002 HC RX W HCPCS: Performed by: NURSE ANESTHETIST, CERTIFIED REGISTERED

## 2021-07-21 PROCEDURE — 7100000000 HC PACU RECOVERY - FIRST 15 MIN: Performed by: SURGERY

## 2021-07-21 PROCEDURE — 7100000010 HC PHASE II RECOVERY - FIRST 15 MIN: Performed by: SURGERY

## 2021-07-21 PROCEDURE — 6360000002 HC RX W HCPCS: Performed by: SURGERY

## 2021-07-21 PROCEDURE — 2709999900 HC NON-CHARGEABLE SUPPLY: Performed by: SURGERY

## 2021-07-21 PROCEDURE — 3700000000 HC ANESTHESIA ATTENDED CARE: Performed by: SURGERY

## 2021-07-21 PROCEDURE — 3600000002 HC SURGERY LEVEL 2 BASE: Performed by: SURGERY

## 2021-07-21 PROCEDURE — 3600000012 HC SURGERY LEVEL 2 ADDTL 15MIN: Performed by: SURGERY

## 2021-07-21 PROCEDURE — 3700000001 HC ADD 15 MINUTES (ANESTHESIA): Performed by: SURGERY

## 2021-07-21 PROCEDURE — 88304 TISSUE EXAM BY PATHOLOGIST: CPT

## 2021-07-21 PROCEDURE — 2580000003 HC RX 258: Performed by: SURGERY

## 2021-07-21 PROCEDURE — 11770 EXC PILONIDAL CYST SIMPLE: CPT | Performed by: SURGERY

## 2021-07-21 PROCEDURE — 7100000001 HC PACU RECOVERY - ADDTL 15 MIN: Performed by: SURGERY

## 2021-07-21 RX ORDER — KETOROLAC TROMETHAMINE 30 MG/ML
INJECTION, SOLUTION INTRAMUSCULAR; INTRAVENOUS PRN
Status: DISCONTINUED | OUTPATIENT
Start: 2021-07-21 | End: 2021-07-21 | Stop reason: SDUPTHER

## 2021-07-21 RX ORDER — HYDRALAZINE HYDROCHLORIDE 20 MG/ML
5 INJECTION INTRAMUSCULAR; INTRAVENOUS EVERY 10 MIN PRN
Status: DISCONTINUED | OUTPATIENT
Start: 2021-07-21 | End: 2021-07-21 | Stop reason: HOSPADM

## 2021-07-21 RX ORDER — DIPHENHYDRAMINE HYDROCHLORIDE 50 MG/ML
12.5 INJECTION INTRAMUSCULAR; INTRAVENOUS
Status: DISCONTINUED | OUTPATIENT
Start: 2021-07-21 | End: 2021-07-21 | Stop reason: HOSPADM

## 2021-07-21 RX ORDER — SODIUM CHLORIDE 0.9 % (FLUSH) 0.9 %
5-40 SYRINGE (ML) INJECTION EVERY 12 HOURS SCHEDULED
Status: DISCONTINUED | OUTPATIENT
Start: 2021-07-21 | End: 2021-07-21 | Stop reason: HOSPADM

## 2021-07-21 RX ORDER — SODIUM CHLORIDE 0.9 % (FLUSH) 0.9 %
5-40 SYRINGE (ML) INJECTION PRN
Status: DISCONTINUED | OUTPATIENT
Start: 2021-07-21 | End: 2021-07-21 | Stop reason: HOSPADM

## 2021-07-21 RX ORDER — SODIUM CHLORIDE 9 MG/ML
INJECTION, SOLUTION INTRAVENOUS CONTINUOUS
Status: DISCONTINUED | OUTPATIENT
Start: 2021-07-21 | End: 2021-07-21 | Stop reason: HOSPADM

## 2021-07-21 RX ORDER — BUPIVACAINE HYDROCHLORIDE 5 MG/ML
INJECTION, SOLUTION EPIDURAL; INTRACAUDAL PRN
Status: DISCONTINUED | OUTPATIENT
Start: 2021-07-21 | End: 2021-07-21 | Stop reason: ALTCHOICE

## 2021-07-21 RX ORDER — MEPERIDINE HYDROCHLORIDE 25 MG/ML
12.5 INJECTION INTRAMUSCULAR; INTRAVENOUS; SUBCUTANEOUS EVERY 5 MIN PRN
Status: DISCONTINUED | OUTPATIENT
Start: 2021-07-21 | End: 2021-07-21 | Stop reason: HOSPADM

## 2021-07-21 RX ORDER — SODIUM CHLORIDE 9 MG/ML
25 INJECTION, SOLUTION INTRAVENOUS PRN
Status: DISCONTINUED | OUTPATIENT
Start: 2021-07-21 | End: 2021-07-21 | Stop reason: HOSPADM

## 2021-07-21 RX ORDER — FENTANYL CITRATE 50 UG/ML
INJECTION, SOLUTION INTRAMUSCULAR; INTRAVENOUS PRN
Status: DISCONTINUED | OUTPATIENT
Start: 2021-07-21 | End: 2021-07-21 | Stop reason: SDUPTHER

## 2021-07-21 RX ORDER — FENTANYL CITRATE 50 UG/ML
50 INJECTION, SOLUTION INTRAMUSCULAR; INTRAVENOUS EVERY 5 MIN PRN
Status: DISCONTINUED | OUTPATIENT
Start: 2021-07-21 | End: 2021-07-21 | Stop reason: HOSPADM

## 2021-07-21 RX ORDER — PROPOFOL 10 MG/ML
INJECTION, EMULSION INTRAVENOUS PRN
Status: DISCONTINUED | OUTPATIENT
Start: 2021-07-21 | End: 2021-07-21 | Stop reason: SDUPTHER

## 2021-07-21 RX ORDER — LABETALOL 20 MG/4 ML (5 MG/ML) INTRAVENOUS SYRINGE
5 4 TIMES DAILY PRN
Status: DISCONTINUED | OUTPATIENT
Start: 2021-07-21 | End: 2021-07-21 | Stop reason: HOSPADM

## 2021-07-21 RX ORDER — ONDANSETRON 2 MG/ML
4 INJECTION INTRAMUSCULAR; INTRAVENOUS EVERY 6 HOURS PRN
Status: DISCONTINUED | OUTPATIENT
Start: 2021-07-21 | End: 2021-07-21 | Stop reason: HOSPADM

## 2021-07-21 RX ORDER — SODIUM HYPOCHLORITE 2.5 MG/ML
SOLUTION TOPICAL
Qty: 1 BOTTLE | Refills: 1 | Status: SHIPPED | OUTPATIENT
Start: 2021-07-21 | End: 2021-07-28

## 2021-07-21 RX ORDER — HYDROCODONE BITARTRATE AND ACETAMINOPHEN 5; 325 MG/1; MG/1
1 TABLET ORAL EVERY 4 HOURS PRN
Qty: 30 TABLET | Refills: 0 | Status: SHIPPED | OUTPATIENT
Start: 2021-07-21 | End: 2021-07-26

## 2021-07-21 RX ORDER — SULFAMETHOXAZOLE AND TRIMETHOPRIM 800; 160 MG/1; MG/1
1 TABLET ORAL 2 TIMES DAILY
Qty: 20 TABLET | Refills: 0 | Status: SHIPPED | OUTPATIENT
Start: 2021-07-21 | End: 2021-07-31

## 2021-07-21 RX ORDER — ROCURONIUM BROMIDE 10 MG/ML
INJECTION, SOLUTION INTRAVENOUS PRN
Status: DISCONTINUED | OUTPATIENT
Start: 2021-07-21 | End: 2021-07-21 | Stop reason: SDUPTHER

## 2021-07-21 RX ORDER — DEXAMETHASONE SODIUM PHOSPHATE 10 MG/ML
INJECTION, EMULSION INTRAMUSCULAR; INTRAVENOUS PRN
Status: DISCONTINUED | OUTPATIENT
Start: 2021-07-21 | End: 2021-07-21 | Stop reason: SDUPTHER

## 2021-07-21 RX ORDER — MORPHINE SULFATE 2 MG/ML
2 INJECTION, SOLUTION INTRAMUSCULAR; INTRAVENOUS EVERY 5 MIN PRN
Status: DISCONTINUED | OUTPATIENT
Start: 2021-07-21 | End: 2021-07-21 | Stop reason: HOSPADM

## 2021-07-21 RX ORDER — ONDANSETRON 2 MG/ML
4 INJECTION INTRAMUSCULAR; INTRAVENOUS
Status: DISCONTINUED | OUTPATIENT
Start: 2021-07-21 | End: 2021-07-21 | Stop reason: HOSPADM

## 2021-07-21 RX ORDER — HYDROCODONE BITARTRATE AND ACETAMINOPHEN 5; 325 MG/1; MG/1
1 TABLET ORAL EVERY 4 HOURS PRN
Status: DISCONTINUED | OUTPATIENT
Start: 2021-07-21 | End: 2021-07-21 | Stop reason: HOSPADM

## 2021-07-21 RX ADMIN — DEXAMETHASONE SODIUM PHOSPHATE 4 MG: 10 INJECTION, EMULSION INTRAMUSCULAR; INTRAVENOUS at 13:37

## 2021-07-21 RX ADMIN — SUGAMMADEX 200 MG: 100 INJECTION, SOLUTION INTRAVENOUS at 13:43

## 2021-07-21 RX ADMIN — ROCURONIUM BROMIDE 50 MG: 10 INJECTION INTRAVENOUS at 13:20

## 2021-07-21 RX ADMIN — ONDANSETRON HYDROCHLORIDE 4 MG: 4 INJECTION, SOLUTION INTRAMUSCULAR; INTRAVENOUS at 13:37

## 2021-07-21 RX ADMIN — KETOROLAC TROMETHAMINE 30 MG: 30 INJECTION, SOLUTION INTRAMUSCULAR; INTRAVENOUS at 13:44

## 2021-07-21 RX ADMIN — CEFAZOLIN 3000 MG: 10 INJECTION, POWDER, FOR SOLUTION INTRAVENOUS at 13:22

## 2021-07-21 RX ADMIN — FENTANYL CITRATE 100 MCG: 50 INJECTION, SOLUTION INTRAMUSCULAR; INTRAVENOUS at 13:20

## 2021-07-21 RX ADMIN — SODIUM CHLORIDE: 9 INJECTION, SOLUTION INTRAVENOUS at 13:15

## 2021-07-21 RX ADMIN — PROPOFOL 200 MG: 10 INJECTION, EMULSION INTRAVENOUS at 13:20

## 2021-07-21 ASSESSMENT — PAIN - FUNCTIONAL ASSESSMENT: PAIN_FUNCTIONAL_ASSESSMENT: 0-10

## 2021-07-21 ASSESSMENT — PULMONARY FUNCTION TESTS
PIF_VALUE: 20
PIF_VALUE: 15
PIF_VALUE: 0
PIF_VALUE: 20
PIF_VALUE: 21
PIF_VALUE: 20
PIF_VALUE: 21
PIF_VALUE: 19
PIF_VALUE: 21
PIF_VALUE: 21
PIF_VALUE: 20
PIF_VALUE: 2
PIF_VALUE: 0
PIF_VALUE: 21
PIF_VALUE: 23
PIF_VALUE: 7
PIF_VALUE: 11
PIF_VALUE: 0
PIF_VALUE: 20
PIF_VALUE: 0
PIF_VALUE: 13
PIF_VALUE: 7
PIF_VALUE: 23
PIF_VALUE: 20
PIF_VALUE: 15
PIF_VALUE: 15
PIF_VALUE: 0
PIF_VALUE: 1
PIF_VALUE: 20
PIF_VALUE: 15
PIF_VALUE: 20
PIF_VALUE: 21
PIF_VALUE: 21
PIF_VALUE: 17
PIF_VALUE: 23
PIF_VALUE: 0
PIF_VALUE: 20
PIF_VALUE: 1
PIF_VALUE: 13
PIF_VALUE: 20
PIF_VALUE: 4

## 2021-07-21 ASSESSMENT — PAIN SCALES - GENERAL
PAINLEVEL_OUTOF10: 0
PAINLEVEL_OUTOF10: 0

## 2021-07-21 NOTE — H&P
Wilma Kirkland. [facility], [address] [place] [address]  [phone]  New Patient Evaluation in Office    Pt Name: Eliana Zurita  Date of Birth 1990   Today's Date: 7/20/2021  Medical Record Number: [id number]  Referring Provider: No ref. provider found  Primary Care Provider: Thomas Fontana MD  Chief Complaint   Patient presents with   [employer] Surgical Consult     new patient--self ref for pilonidal cyst     ASSESSMENT       Diagnosis Orders   1. Pilonidal cyst  EXCISION PILONIDAL CYST / SINUS   History reviewed. No pertinent past medical history. PLANS      1. Schedule Rafael for pilonidal cystectomy  2. Potential diagnostic and therapeutic options as well as alternatives were discussed with the patient. The potential for recurrence, infection, bleeding and an open wound were discussed. Specific reinforcement about appropriate wound care was discussed. The patient was given an opportunity to ask questions and has agreed to proceed with the procedure. 3. Status: outpatient  4. Planned anesthesia: general  5. He will undergo pre-operative clearance per anesthesia guidelines with risk factors listed under the past medical history diagnosis & problem list.     Rayshawn Steel is a 28 y.o. male seen in the consultation for evaluation of a pilonidal cyst. Lesion is located in the midline gluteal fold. It began 2 weeks ago and has progressed to a point and plateaued. He has had 2 flare ups since it first began, and has had purulent drainage with removal of a hair from site. Symptoms include discharge, redness and pain and have waxed and waned but are better overall. He has associated symptoms of spontaneous drainage, pain. He does not have previous history of cutaneous abscesses. He does not have diabetes, immunosuppression or history of MRSA. No prior therapy other than keeping area clean. Pain is controlled without any medications. .  Past Medical History  History reviewed. No pertinent past medical history. Past Surgical History  History reviewed. No pertinent surgical history. Medications  No current outpatient medications on file. No current facility-administered medications for this visit. Allergies  has No Known Allergies. Family History  family history includes Diabetes in his father; High Blood Pressure in his father. Social History   reports that he has never smoked. He has never used smokeless tobacco. He reports current alcohol use. He reports that he does not use drugs. Health Screening Exams  Health Maintenance   Topic Date Due    Hepatitis C screen  Never done    Varicella vaccine (1 of 2 - 2-dose childhood series) Never done    HIV screen  Never done    DTaP/Tdap/Td vaccine (1 - Tdap) Never done    Flu vaccine (1) 09/01/2021    COVID-19 Vaccine  Completed    Hepatitis A vaccine  Aged Out    Hepatitis B vaccine  Aged Out    Hib vaccine  Aged Out    Meningococcal (ACWY) vaccine  Aged Out    Pneumococcal 0-64 years Vaccine  Aged Out     Review of Systems  Review of Systems   Constitutional: Negative. HENT: Negative. Eyes: Negative. Respiratory: Negative. Cardiovascular: Negative. Gastrointestinal: Negative. Endocrine: Negative. Genitourinary: Negative. Musculoskeletal: Negative. Skin: Negative. Allergic/Immunologic: Negative. Neurological: Negative. Hematological: Negative. Psychiatric/Behavioral: Negative. OBJECTIVE    VITALS:  height is 6' (1.829 m) and weight is 269 lb 3.2 oz (122.1 kg). His temporal temperature is 97.3 °F (36.3 °C). His blood pressure is 122/82 and his pulse is 88. His respiration is 16 and oxygen saturation is 96%. Pain Score:   4 Body mass index is 36.51 kg/m². CONSTITUTIONAL: Alert and oriented times 3, no acute distress and cooperative to examination with proper mood and affect.   SKIN: Skin color, texture, turgor normal. No rashes or lesions. LYMPH: no cervical nodes, no inguinal nodes  HEENT: Head is normocephalic, atraumatic. EOMI, PERRLA. NECK: Supple, symmetrical, trachea midline, no adenopathy, thyroid symmetric, not enlarged and no tenderness, skin normal.  CHEST/LUNGS: chest symmetric with normal A/P diameter, normal respiratory rate and rhythm, lungs clear to auscultation without wheezes, rales or rhonchi. No accessory muscle use. Scars None   CARDIOVASCULAR: Heart sounds are normal.  Regular rate and rhythm without murmur, gallop or rub. Normal S1 and S2. .  Carotid and femoral pulses 2+/4 and equal bilaterally. ABDOMEN: Normal shape. No scar(s) present. Normal bowel sounds. No bruits. soft, nontender, nondistended, no masses or organomegaly. no evidence of hernia. Percussion: Normal without hepatosplenomegally. RECTAL: 1 cm area of erythema at superior margin of gluteal fold. No active drainage present. NEUROLOGIC: There are no focalizing motor or sensory deficits. CN II-XII are grossly intact. Kaela Shaina EXTREMITIES: no cyanosis, no clubbing and no edema. Thank you for the interesting evaluation. Further recommendations as listed above. Electronically signed by Ag Parr MD on 7/20/2021 at [address] [place] [address]  GENERAL SURGERY  History and Physical Update    Pt Name: Marianne Lemon  MRN: [id number]  YOB: 1990  Date of evaluation: 7/21/2021    I have examined the patient and reviewed the H&P/Consult and there are no changes to the patient or plans.          Electronically signed by Tunde Guevara DO on 7/21/2021 at 12:58 PM

## 2021-07-21 NOTE — ANESTHESIA PRE PROCEDURE
Department of Anesthesiology  Preprocedure Note       Name:  Oscar Martinez   Age:  28 y.o.  :  1990                                          MRN:  [id number]         Date:  2021      Surgeon: Jennifer Mcdermott):  Marvin Petty DO    Procedure: Procedure(s):  PILONIDAL CYSTECTOMY    Medications prior to admission:   Prior to Admission medications    Not on File       Current medications:    Current Facility-Administered Medications   Medication Dose Route Frequency Provider Last Rate Last Admin    0.9 % sodium chloride infusion   Intravenous Continuous Noah Houon Carineser, DO        sodium chloride flush 0.9 % injection 5-40 mL  5-40 mL Intravenous 2 times per day Noah Houon Carineser, DO        sodium chloride flush 0.9 % injection 5-40 mL  5-40 mL Intravenous PRN Noah Houon Carineser, DO        0.9 % sodium chloride infusion  25 mL Intravenous PRN Noah Houon Carineser, DO        ceFAZolin (ANCEF) 3000 mg in dextrose 5 % 100 mL IVPB  3,000 mg Intravenous On Call to [address] [place], [place] [address]DO           Allergies:  No Known Allergies    Problem List:  There is no problem list on file for this patient. Past Medical History:  History reviewed. No pertinent past medical history. Past Surgical History:  History reviewed. No pertinent surgical history.     Social History:    Social History     Tobacco Use    Smoking status: Never Smoker    Smokeless tobacco: Never Used   Substance Use Topics    Alcohol use: Yes     Comment: 6 drinks/week                                Counseling given: Not Answered      Vital Signs (Current):   Vitals:    21 1223   BP: (!) 141/84   Pulse: 93   Resp: 16   Temp: 98.4 °F (36.9 °C)   TempSrc: Temporal   SpO2: 99%   Weight: 265 lb (120.2 kg)   Height: 6' (1.829 m)                                              BP Readings from Last 3 Encounters:   21 (!) 141/84   21 122/82   05/10/21 126/74       NPO Status: Time of last liquid consumption: 0800

## 2021-07-21 NOTE — OP NOTE
Adalid Watts  RECORD OF OPERATION  PATIENT NAME: [id number] [employer] RECORD NO. [id number]  SURGEON: Rosanne Briseno  Primary Care Physician: Mitesh Abbasi MD     PROCEDURE PERFORMED:  7/21/2021  PREOPERATIVE DIAGNOSIS: PILONIDAL CYST  POSTOPERATIVE DIAGNOSIS: Same, path pending  PROCEDURE PERFORMED:  Pilonidal cystectomy  SURGEON:  Dr. Maira Starks. ANESTHESIA:  general  ESTIMATED BLOOD LOSS:  3 ml  SPECIMEN:  Pilonidal cyst sent to pathology for analysis. COMPLICATIONS:  None immediately appreciated. DISCUSSION: Priya Polk is a 28y.o. year old male who presented to my office seen in evaluation at request of Mitesh Abbasi MD regarding a pilonidal cyst. After history and physical examination was performed potential diagnostic and therapeutic modalities discussed with the patient. Operative and non operative management was discussed. He was given opportunity to ask questions. Once answered informed consent was obtained. He was brought to operating room on 7/21/2021 for procedure. OPERATIVE FINDINGS:  At time of exploration, pilonidal cyst was removed. PROCEDURE:  The patient was brought to the operating room and placed under continuous cardiac telemetry, blood pressure, pulse oximetry monitoring and placed under general anesthesia by anesthesia department. He was then placed in a prone position and padded appropriately. The posterior sacral area was prepped and draped in sterile fashion. The pilonidal cyst identified and opened with electrocautery. It was isolated to the midline with no lateralizing tracts. The cyst was then excised using electrocautery. The wound was irrigated and adequate hemostasis was appreciated. The wound was infiltrated with local anesthetic and packed with iodophor gauze. The wound was then cleansed and sterile dressings were applied. Patient brought out of anesthesia, transferred to PACU in stable and satisfactory condition.  No immediate complications evident. All sponge, instrument and needle counts were correct at the completion of procedure. Postoperative findings were discussed with the patient's family. He was given discharge instructions, prescriptions for analgesics. He will follow up in my office in a 1-week period of time for reevaluation.       Electronically signed by Amanda Denton DO on 7/21/2021 at 1:47 PM

## 2021-07-21 NOTE — PROGRESS NOTES
1354 Patient to PACU from surgery. Report received from [facility] and [employer]. Patient with oral airway in when presented here, immediately taken out by [facility]. Patient's respirations easy and regular on room air. VSS, patient ST on monitor. Dr. Manuel Arias at bedside to check on patient. Lsely Ethel 9 infusing into right hand to gravity. 1357 Patient turned on his side to assess surgical site. Covered in dressing, no drainage noted on dressing. 1400 Patient alert and oriented, talking. Denies any pain at this time. 1405 Patient still ST on monitor, heart rate 100-105. Patient encouraged to lay back and relax. No nausea at this time. 1410 IV still infusing into hand, no problems at this time. 1415 Patient alert and oriented. Discussed medication sent to pharmacy and also two paper scripts that patient will need to take himself. Heart rate NSR 95-98.  1420 Patient still denying any pain or nausea at this time. VSS. No drainage noted on dressing at this time. 1424 Patient meets discharge criteria to go from PACU to Phase 2. Wife at bedside, updated on patient conditions. All belongings at bedside. 1435 Patient given snack and drink, no nausea issues. 1455 AVS discussed with patient and wife. Discussed dressing change and follow up appointment. Both deny having any questions at this time. Instructed to get the [place] at [facility] and wife given paper script for Norco and Bactrim, copies of these placed in the chart. IV take out, dressing applied. 1458 Patient walked to discharge doors with all documented belongings. No further questions at this time.

## 2021-07-22 ENCOUNTER — TELEPHONE (OUTPATIENT)
Dept: SURGERY | Age: 31
End: 2021-07-22

## 2021-07-22 NOTE — ANESTHESIA POSTPROCEDURE EVALUATION
Department of Anesthesiology  Postprocedure Note    Patient: Donnie Cooper  MRN: [id number]  YOB: 1990  Date of evaluation: 7/22/2021  Time:  7:38 AM     Procedure Summary     Date: 07/21/21 Room / Location: [address] [place] [address] 01 / 138 [facility]    Anesthesia Start: 7094 Anesthesia Stop: 9253    Procedure: PILONIDAL CYSTECTOMY (N/A Buttocks) Diagnosis: (PILONIDAL CYST)    Surgeons: Lewayne Siemens, DO Responsible Provider: Liz Ji MD    Anesthesia Type: general ASA Status: 2          Anesthesia Type: general    Lloyd Phase I: Lloyd Score: 10    Lloyd Phase II: Lloyd Score: 10    Last vitals: Reviewed and per EMR flowsheets.        Anesthesia Post Evaluation    Patient location during evaluation: PACU  Complications: no  Cardiovascular status: hemodynamically stable  Respiratory status: acceptable

## 2021-07-29 NOTE — PROGRESS NOTES
Adele Russo. [facility], [address] [place] [address]. [address] [place], [place] [id number]  [phone]  Post Procedure Evaluation in Office    Date of Birth 1990   Today's Date: 7/30/2021  Medical Record Number: [id number]  Referring Provider: No ref. provider found  Primary Care Provider: Sheree Lucio MD  Chief Complaint   Patient presents with   [employer] Post-Op Check     s/p Pilonidal cystectomy-7/21/2021     ASSESSMENT       Diagnosis Orders   1. S/P surgical removal of pilonidal cyst     Wound is good granulation tissue, healing as expected with appropriate granulation tissue present\"   PLANS      Pathology reviewed with the patient who understands. All questions were answered. Continue daily and prn dressing changes with AMD dressings. Follow up: Return in about 1 week (around 8/6/2021). Leatha Elias is seen today for post-op follow-up of pilonidal cystectomy on 7/21/21. He is tolerating a regular diet, having regular bowel movements. Symptoms and activity have gradually improved compared to preoperative. The surgical site is clean and has no drainage. Pain is controlled without any narcotic pain medications. He has compliant with postoperative instructions. Past Medical History   has no past medical history on file. Past Surgical History   has a past surgical history that includes Pilonidal cyst excision (N/A, 7/21/2021). Medications  has a current medication list which includes the following prescription(s): sulfamethoxazole-trimethoprim. Allergies  has No Known Allergies. Social History   reports that he has never smoked. He has never used smokeless tobacco. He reports current alcohol use. He reports that he does not use drugs.   Health Screening Exams  Health Maintenance   Topic Date Due    Hepatitis C screen  Never done    Varicella vaccine (1 of 2 - 2-dose childhood series) Never done    HIV screen  Never done    DTaP/Tdap/Td vaccine (1 - Tdap) Never done    Flu vaccine (1) 09/01/2021    COVID-19 Vaccine  Completed    Hepatitis A vaccine  Aged Out    Hepatitis B vaccine  Aged Out    Hib vaccine  Aged Out    Meningococcal (ACWY) vaccine  Aged Out    Pneumococcal 0-64 years Vaccine  Aged Out     Review of Systems  History obtained from the patient. Constitutional: Denies any fever or chills. Wound: Denies rash, skin color change or wound problems. OBJECTIVE      VITALS:  weight is 267 lb (121.1 kg). His infrared temperature is 96.4 °F (35.8 °C). His blood pressure is 120/78 and his pulse is 74. His respiration is 18. Pain Score:   2  CONSTITUTIONAL: Alert and oriented times 3, no acute distress and cooperative to examination. SKIN: Skin color, texture, turgor normal. No rashes or lesions. INCISION: appears improved compared to last recheck  no exudate. Thank you for the interesting evaluation. Further recommendations as listed above.      Electronically signed by Edwardo Walters MD on 7/30/2021 at 1:22 PM

## 2021-07-30 ENCOUNTER — OFFICE VISIT (OUTPATIENT)
Dept: SURGERY | Age: 31
End: 2021-07-30

## 2021-07-30 VITALS
RESPIRATION RATE: 18 BRPM | TEMPERATURE: 96.4 F | HEART RATE: 74 BPM | WEIGHT: 267 LBS | BODY MASS INDEX: 36.21 KG/M2 | DIASTOLIC BLOOD PRESSURE: 78 MMHG | SYSTOLIC BLOOD PRESSURE: 120 MMHG

## 2021-07-30 DIAGNOSIS — Z98.890 S/P SURGICAL REMOVAL OF PILONIDAL CYST: Primary | ICD-10-CM

## 2021-07-30 PROCEDURE — 99024 POSTOP FOLLOW-UP VISIT: CPT | Performed by: SURGERY

## 2021-07-30 NOTE — LETTER
Matthias Yoder   [address] [address] [address]  [phone]     Pt Name: Martina Lowery  Medical Record Number: [id number]  Date of Birth 1990   Today's Date: 7/30/2021    Candy Jones was evaluated in the office today. My assessment and plans are listed below. ASSESSMENT     Diagnosis Orders   1. S/P surgical removal of pilonidal cyst      Wound is good granulation tissue, healing as expected with appropriate granulation tissue present\"   PLANS   Pathology reviewed with the patient who understands. All questions were answered. Continue daily and prn dressing changes with AMD dressings.     Follow up: Return in about 1 week (around 8/6/2021). If I can provide any additional assistance or you have any concerns, please feel free to contact me. Thank you for allowing to participate in the care of your patients. Sincerely,      Jose Webb.  Rosanne Starks

## 2021-08-17 ENCOUNTER — OFFICE VISIT (OUTPATIENT)
Dept: SURGERY | Age: 31
End: 2021-08-17
Payer: COMMERCIAL

## 2021-08-17 VITALS
RESPIRATION RATE: 18 BRPM | DIASTOLIC BLOOD PRESSURE: 60 MMHG | OXYGEN SATURATION: 96 % | HEART RATE: 127 BPM | TEMPERATURE: 97.6 F | WEIGHT: 267.6 LBS | SYSTOLIC BLOOD PRESSURE: 118 MMHG | HEIGHT: 72 IN | BODY MASS INDEX: 36.24 KG/M2

## 2021-08-17 DIAGNOSIS — Z98.890 S/P SURGICAL REMOVAL OF PILONIDAL CYST: Primary | ICD-10-CM

## 2021-08-17 PROCEDURE — 99211 OFF/OP EST MAY X REQ PHY/QHP: CPT | Performed by: SURGERY

## 2021-08-17 NOTE — LETTER
[place],   [address] [facility]. [address]  [facility] [address]  [phone]     Pt Name: Lenny Miles  Medical Record Number: [id number]  Date of Birth 1990   Today's Date: 8/18/2021    Ladarius Hardin was evaluated in the office today. My assessment and plans are listed below. ASSESSMENT      Diagnosis Orders   1. S/P surgical removal of pilonidal cyst      7/21/21   Wound is healing as expected with appropriate granulation tissue present\"  PLANS:   Pathology reviewed with the patient who understands. All questions were answered. Continue daily and prn dressing changes with AMD dressings. Follow up: Return in about 3 weeks (around 9/7/2021). If I can provide any additional assistance or you have any concerns, please feel free to contact me. Thank you for allowing to participate in the care of your patients. Sincerely,      Opal Daugherty.  Rosanne Starks

## 2021-08-17 NOTE — PROGRESS NOTES
Sherry Delcid. [facility], [address] [place], [place] [address]  [phone]  Post Procedure Evaluation in Office    Date of Birth 1990   Today's Date: 8/18/2021  Medical Record Number: [id number]  Referring Provider: No ref. provider found  Primary Care Provider: Fay Haynes MD  Chief Complaint   Patient presents with    Post-Op Check     s/p Pilonidal cystectomy-7/21/2021 Last seen in the office on 7/30/2021     ASSESSMENT       Diagnosis Orders   1. S/P surgical removal of pilonidal cyst      7/21/21   Wound is healing as expected with appropriate granulation tissue present\"   PLANS      Pathology reviewed with the patient who understands. All questions were answered. Continue daily and prn dressing changes with AMD dressings. Follow up: Return in about 3 weeks (around 9/7/2021). Paco Garzon is seen today for post-op follow-up. He is S/p pilonidal cystectomy 7/21/21. He is tolerating a regular diet, having regular bowel movements. Symptoms and activity have gradually improved compared to preoperative. The surgical site is clean and has no drainage. Pain is controlled without any narcotic pain medications. He has compliant with postoperative instructions. Past Medical History   has no past medical history on file. Past Surgical History   has a past surgical history that includes Pilonidal cyst excision (N/A, 7/21/2021). Medications  currently has no medications in their medication list.  Allergies  has No Known Allergies. Social History   reports that he has never smoked. He has never used smokeless tobacco. He reports current alcohol use. He reports that he does not use drugs.   Health Screening Exams  Health Maintenance   Topic Date Due    Hepatitis C screen  Never done    Varicella vaccine (1 of 2 - 2-dose childhood series) Never done    HIV screen  Never done    DTaP/Tdap/Td vaccine (1 - Tdap) Never done    Flu vaccine (1) 09/01/2021    COVID-19 Vaccine  Completed    Hepatitis A vaccine  Aged Out    Hepatitis B vaccine  Aged Out    Hib vaccine  Aged Out    Meningococcal (ACWY) vaccine  Aged Out    Pneumococcal 0-64 years Vaccine  Aged Out     Review of Systems  History obtained from the patient. Constitutional: Denies any fever or chills. Wound: Denies rash, skin color change or wound problems. OBJECTIVE      VITALS:  height is 6' (1.829 m) and weight is 267 lb 9.6 oz (121.4 kg). His temporal temperature is 97.6 °F (36.4 °C). His blood pressure is 118/60 and his pulse is 127. His respiration is 18 and oxygen saturation is 96%. Pain Score:   0 - No pain  CONSTITUTIONAL: Alert and oriented times 3, no acute distress and cooperative to examination. SKIN: Skin color, texture, turgor normal. No rashes or lesions. INCISION: appears improved compared to last recheck  no exudate. Thank you for the interesting evaluation. Further recommendations as listed above.      Electronically signed by Costa Salinas DO on 8/18/2021 at 7:37 AM

## 2021-09-09 NOTE — PROGRESS NOTES
Tomás Felix. [facility], [address] [place], [place] [address]  [phone]  Post Procedure Evaluation in Office    Date of Birth 1990   Today's Date: 9/10/2021  Medical Record Number: [id number]  Referring Provider: No ref. provider found  Primary Care Provider: Maxim Rodriguez MD  Chief Complaint   Patient presents with    Post-Op Check     s/p Pilonidal cystectomy-7/21/2021 Last seen in the office on 8/17/2021     ASSESSMENT       Diagnosis Orders   1. S/P surgical removal of pilonidal cyst      7/21/21   Wound is healing as expected with appropriate granulation tissue present\"   PLANS      Pathology reviewed with the patient who understands. All questions were answered. Continue daily and prn dressing changes with AMD dressings. Follow up: Return in about 2 weeks (around 9/24/2021). Mikie Dos Santos is seen today for post-op follow-up. He is S/p pilonidal cystectomy 7/21/21. He is tolerating a regular diet, having regular bowel movements. Symptoms and activity have gradually improved compared to preoperative. The surgical site is clean and has no drainage. Pain is controlled without any narcotic pain medications. He has compliant with postoperative instructions. Past Medical History   has no past medical history on file. Past Surgical History   has a past surgical history that includes Pilonidal cyst excision (N/A, 7/21/2021). Medications  currently has no medications in their medication list.  Allergies  has No Known Allergies. Social History   reports that he has never smoked. He has never used smokeless tobacco. He reports current alcohol use. He reports that he does not use drugs.   Health Screening Exams  Health Maintenance   Topic Date Due    Hepatitis C screen  Never done    Varicella vaccine (1 of 2 - 2-dose childhood series) Never done    HIV screen  Never done    DTaP/Tdap/Td vaccine (1 - Tdap) Never done    Flu vaccine (1) 09/01/2021    COVID-19 Vaccine  Completed    Hepatitis A vaccine  Aged Out    Hepatitis B vaccine  Aged Out    Hib vaccine  Aged Out    Meningococcal (ACWY) vaccine  Aged Out    Pneumococcal 0-64 years Vaccine  Aged Out     Review of Systems  History obtained from the patient. Constitutional: Denies any fever or chills. Wound: Denies rash, skin color change or wound problems. OBJECTIVE      VITALS:  height is 6' (1.829 m) and weight is 267 lb 14.4 oz (121.5 kg). His temporal temperature is 97.9 °F (36.6 °C). His blood pressure is 120/62 and his pulse is 92. His respiration is 18 and oxygen saturation is 97%. Pain Score:   0 - No pain  CONSTITUTIONAL: Alert and oriented times 3, no acute distress and cooperative to examination. SKIN: Skin color, texture, turgor normal. No rashes or lesions. INCISION: appears improved compared to last recheck  no exudate. Thank you for the interesting evaluation. Further recommendations as listed above.      Electronically signed by Radha Corona DO on 9/10/2021 at 12:49 PM

## 2021-09-10 ENCOUNTER — OFFICE VISIT (OUTPATIENT)
Dept: SURGERY | Age: 31
End: 2021-09-10
Payer: COMMERCIAL

## 2021-09-10 VITALS
SYSTOLIC BLOOD PRESSURE: 120 MMHG | TEMPERATURE: 97.9 F | HEART RATE: 92 BPM | WEIGHT: 267.9 LBS | HEIGHT: 72 IN | RESPIRATION RATE: 18 BRPM | BODY MASS INDEX: 36.29 KG/M2 | OXYGEN SATURATION: 97 % | DIASTOLIC BLOOD PRESSURE: 62 MMHG

## 2021-09-10 DIAGNOSIS — Z98.890 S/P SURGICAL REMOVAL OF PILONIDAL CYST: Primary | ICD-10-CM

## 2021-09-10 PROCEDURE — 99211 OFF/OP EST MAY X REQ PHY/QHP: CPT | Performed by: SURGERY

## 2021-09-10 NOTE — LETTER
[place],   [address] [address] [address]  [phone]     Pt Name: Ole De La Torre  Medical Record Number: [id number]  Date of Birth 1990   Today's Date: 9/10/2021    Radha Hill was evaluated in the office today. My assessment and plans are listed below. ASSESSMENT      Diagnosis Orders   1. S/P surgical removal of pilonidal cyst      7/21/21   Wound is healing as expected with appropriate granulation tissue present\"  PLANS:   Pathology reviewed with the patient who understands. All questions were answered. Continue daily and prn dressing changes with AMD dressings. Follow up: Return in about 2 weeks (around 9/24/2021). If I can provide any additional assistance or you have any concerns, please feel free to contact me. Thank you for allowing to participate in the care of your patients. Sincerely,      Miranda Riggs.  Rosanne Starks

## 2021-09-28 ENCOUNTER — OFFICE VISIT (OUTPATIENT)
Dept: SURGERY | Age: 31
End: 2021-09-28
Payer: COMMERCIAL

## 2021-09-28 VITALS
TEMPERATURE: 97.2 F | DIASTOLIC BLOOD PRESSURE: 80 MMHG | BODY MASS INDEX: 36.16 KG/M2 | RESPIRATION RATE: 14 BRPM | HEIGHT: 72 IN | WEIGHT: 267 LBS | HEART RATE: 67 BPM | OXYGEN SATURATION: 95 % | SYSTOLIC BLOOD PRESSURE: 137 MMHG

## 2021-09-28 DIAGNOSIS — Z98.890 S/P SURGICAL REMOVAL OF PILONIDAL CYST: Primary | ICD-10-CM

## 2021-09-28 PROCEDURE — 99211 OFF/OP EST MAY X REQ PHY/QHP: CPT | Performed by: SURGERY

## 2021-09-28 NOTE — [place]  [address] [address] [address]  [phone]     Pt Name: Ara Santizo  Medical Record Number: [id number]  Date of Birth 1990   Today's Date: 9/28/2021    Renetta Shannon was evaluated in the office today. My assessment and plans are listed below. ASSESSMENT      Diagnosis Orders   1. S/P surgical removal of pilonidal cyst      7/21/21   Wound is healing as expected with appropriate granulation tissue present\"  PLANS:   Pathology reviewed with the patient who understands. All questions were answered. Continue daily and prn dressing changes with AMD dressings. Follow up: Return for As needed. Instructed to call if any concerns. If I can provide any additional assistance or you have any concerns, please feel free to contact me. Thank you for allowing to participate in the care of your patients. Sincerely,      Magan Cazares.  Rosanne Starks

## 2021-09-28 NOTE — PROGRESS NOTES
Joann Beach. [facility], [address] [place] [address]  [phone]  Post Procedure Evaluation in Office    Date of Birth 1990   Today's Date: 9/28/2021  Medical Record Number: [id number]  Referring Provider: No ref. provider found  Primary Care Provider: Daniel Gomez MD  Chief Complaint   Patient presents with   Alisia Pandey Post-Op Check     2 week f/u--s/p Pilonidal cystectomy 7/21/2021      ASSESSMENT       Diagnosis Orders   1. S/P surgical removal of pilonidal cyst      7/21/21   Wound is healing as expected with appropriate granulation tissue present\"   PLANS      Pathology reviewed with the patient who understands. All questions were answered. Continue daily and prn dressing changes with AMD dressings. Follow up: Return for As needed. Instructed to call if any concerns. Andrea Jaeger is seen today for post-op follow-up. He is S/p pilonidal cystectomy 7/21/21. He is tolerating a regular diet, having regular bowel movements. Symptoms and activity have gradually improved compared to preoperative. The surgical site is clean and has no drainage. Pain is controlled without any narcotic pain medications. He has compliant with postoperative instructions. Past Medical History   has no past medical history on file. Past Surgical History   has a past surgical history that includes Pilonidal cyst excision (N/A, 7/21/2021). Medications  currently has no medications in their medication list.  Allergies  has No Known Allergies. Social History   reports that he has never smoked. He has never used smokeless tobacco. He reports current alcohol use. He reports that he does not use drugs.   Health Screening Exams  Health Maintenance   Topic Date Due    Hepatitis C screen  Never done    Varicella vaccine (1 of 2 - 2-dose childhood series) Never done    HIV screen  Never done    DTaP/Tdap/Td vaccine (1 - Tdap) Never done    Flu vaccine (1) 09/01/2021  COVID-19 Vaccine  Completed    Hepatitis A vaccine  Aged Out    Hepatitis B vaccine  Aged Out    Hib vaccine  Aged Out    Meningococcal (ACWY) vaccine  Aged Out    Pneumococcal 0-64 years Vaccine  Aged Out     Review of Systems  History obtained from the patient. Constitutional: Denies any fever or chills. Wound: Denies rash, skin color change or wound problems. OBJECTIVE      VITALS:  height is 6' (1.829 m) and weight is 267 lb (121.1 kg). His tympanic temperature is 97.2 °F (36.2 °C). His blood pressure is 137/80 and his pulse is 67. His respiration is 14 and oxygen saturation is 95%. Pain Score:   0 - No pain  CONSTITUTIONAL: Alert and oriented times 3, no acute distress and cooperative to examination. SKIN: Skin color, texture, turgor normal. No rashes or lesions. INCISION: appears improved compared to last recheck  no exudate. Thank you for the interesting evaluation. Further recommendations as listed above.      Electronically signed by Radha Corona DO on 9/28/2021 at 12:04 PM

## 2021-10-10 ENCOUNTER — HEALTH MAINTENANCE LETTER (OUTPATIENT)
Age: 31
End: 2021-10-10

## 2022-05-21 ENCOUNTER — HEALTH MAINTENANCE LETTER (OUTPATIENT)
Age: 32
End: 2022-05-21

## 2022-06-13 ENCOUNTER — NURSE ONLY (OUTPATIENT)
Dept: OCCUPATIONAL MEDICINE | Age: 32
End: 2022-06-13

## 2022-06-13 DIAGNOSIS — Z02.89 VISIT FOR OCCUPATIONAL HEALTH EXAMINATION: Primary | ICD-10-CM

## 2022-06-13 PROCEDURE — 86480 TB TEST CELL IMMUN MEASURE: CPT | Performed by: INTERNAL MEDICINE

## 2022-06-13 PROCEDURE — OH044 DRUG SCREEN HAIR COLLECTION ONLY: Performed by: PREVENTIVE MEDICINE

## 2022-06-13 PROCEDURE — OH062 RESPIRATOR FIT TESTING: Performed by: PREVENTIVE MEDICINE

## 2022-06-13 PROCEDURE — 36415 COLL VENOUS BLD VENIPUNCTURE: CPT | Performed by: PREVENTIVE MEDICINE

## 2022-06-15 LAB
GAMMA INTERFERON BACKGROUND BLD IA-ACNC: 0.04 IU/ML
M TB IFN-G BLD-IMP: NEGATIVE
M TB IFN-G CD4+ BCKGRND COR BLD-ACNC: 0 IU/ML
M TB IFN-G CD4+CD8+ BCKGRND COR BLD-ACNC: 0 IU/ML
MITOGEN IGNF BCKGRD COR BLD-ACNC: >10 IU/ML

## 2022-08-13 ENCOUNTER — TELEPHONE (OUTPATIENT)
Dept: INTERNAL MEDICINE | Age: 32
End: 2022-08-13

## 2022-08-13 DIAGNOSIS — L05.91 PILONIDAL CYST: Primary | ICD-10-CM

## 2022-08-13 RX ORDER — METRONIDAZOLE 500 MG/1
500 TABLET ORAL 2 TIMES DAILY
Qty: 20 TABLET | Refills: 0 | Status: SHIPPED | OUTPATIENT
Start: 2022-08-13 | End: 2022-08-23

## 2022-09-18 ENCOUNTER — HEALTH MAINTENANCE LETTER (OUTPATIENT)
Age: 32
End: 2022-09-18

## 2023-03-03 ENCOUNTER — EMPLOYEE HEALTH (OUTPATIENT)
Dept: OTHER | Age: 33
End: 2023-03-03

## 2023-03-09 ENCOUNTER — EMPLOYEE HEALTH (OUTPATIENT)
Dept: OTHER | Age: 33
End: 2023-03-09

## 2023-06-04 ENCOUNTER — HEALTH MAINTENANCE LETTER (OUTPATIENT)
Age: 33
End: 2023-06-04

## 2023-10-18 ENCOUNTER — OFFICE VISIT (OUTPATIENT)
Dept: DERMATOLOGY | Age: 33
End: 2023-10-18

## 2023-10-18 DIAGNOSIS — D22.9 COMPOUND NEVUS: ICD-10-CM

## 2023-10-18 DIAGNOSIS — D22.9 MULTIPLE NEVI: ICD-10-CM

## 2023-10-18 DIAGNOSIS — R61 HYPERHIDROSIS: Primary | ICD-10-CM

## 2023-10-18 DIAGNOSIS — D22.39 FIBROUS PAPULE OF NOSE: ICD-10-CM

## 2023-10-18 PROCEDURE — 99204 OFFICE O/P NEW MOD 45 MIN: CPT | Performed by: DERMATOLOGY

## 2023-10-18 RX ORDER — GLYCOPYRRONIUM 2.4 G/100G
1 CLOTH TOPICAL DAILY
Qty: 30 EACH | Refills: 12 | Status: SHIPPED | OUTPATIENT
Start: 2023-10-18

## 2023-10-24 ENCOUNTER — TELEPHONE (OUTPATIENT)
Dept: DERMATOLOGY | Age: 33
End: 2023-10-24

## 2023-10-24 DIAGNOSIS — R61 HYPERHIDROSIS: Primary | ICD-10-CM

## 2023-10-26 RX ORDER — ALUMINUM CHLORIDE 20 %
SOLUTION, NON-ORAL TOPICAL NIGHTLY
Qty: 60 ML | Refills: 11 | Status: SHIPPED | OUTPATIENT
Start: 2023-10-26

## 2023-10-27 ENCOUNTER — TELEPHONE (OUTPATIENT)
Dept: DERMATOLOGY | Age: 33
End: 2023-10-27

## 2023-11-15 ENCOUNTER — HOSPITAL ENCOUNTER (OUTPATIENT)
Dept: REHABILITATION | Age: 33
Discharge: STILL ADMITTED OR ADMINISTRATIVE | End: 2023-11-15

## 2023-11-15 PROCEDURE — 10004173 HB COUNTER-THERAPY VISIT PT

## 2023-11-15 PROCEDURE — 10004135 HB COUNTER-INJURY EVAL PER 15 MIN

## 2025-01-24 ENCOUNTER — OFFICE VISIT (OUTPATIENT)
Dept: INTERNAL MEDICINE | Age: 35
End: 2025-01-24

## 2025-01-24 VITALS
WEIGHT: 269.8 LBS | DIASTOLIC BLOOD PRESSURE: 82 MMHG | BODY MASS INDEX: 36.54 KG/M2 | HEART RATE: 95 BPM | RESPIRATION RATE: 16 BRPM | OXYGEN SATURATION: 96 % | HEIGHT: 72 IN | SYSTOLIC BLOOD PRESSURE: 126 MMHG | TEMPERATURE: 98.6 F

## 2025-01-24 DIAGNOSIS — Z00.00 ANNUAL PHYSICAL EXAM: Primary | ICD-10-CM

## 2025-01-24 DIAGNOSIS — Z23 NEED FOR VACCINATION: ICD-10-CM

## 2025-01-24 DIAGNOSIS — Z83.2 FAMILY HISTORY OF BLOOD COAGULATION DISORDER: ICD-10-CM

## 2025-01-24 SDOH — ECONOMIC STABILITY: TRANSPORTATION INSECURITY
IN THE PAST 12 MONTHS, HAS LACK OF RELIABLE TRANSPORTATION KEPT YOU FROM MEDICAL APPOINTMENTS, MEETINGS, WORK OR FROM GETTING THINGS NEEDED FOR DAILY LIVING?: NO

## 2025-01-24 SDOH — ECONOMIC STABILITY: FOOD INSECURITY: WITHIN THE PAST 12 MONTHS, THE FOOD YOU BOUGHT JUST DIDN'T LAST AND YOU DIDN'T HAVE MONEY TO GET MORE.: NEVER TRUE

## 2025-01-24 SDOH — ECONOMIC STABILITY: HOUSING INSECURITY: DO YOU HAVE PROBLEMS WITH ANY OF THE FOLLOWING?: NONE OF THE ABOVE

## 2025-01-24 SDOH — ECONOMIC STABILITY: HOUSING INSECURITY: WHAT IS YOUR LIVING SITUATION TODAY?: I HAVE A STEADY PLACE TO LIVE

## 2025-01-24 SDOH — ECONOMIC STABILITY: GENERAL: WOULD YOU LIKE HELP WITH ANY OF THE FOLLOWING NEEDS?: I DON'T WANT HELP WITH ANY OF THESE

## 2025-01-24 ASSESSMENT — PATIENT HEALTH QUESTIONNAIRE - PHQ9
SUM OF ALL RESPONSES TO PHQ9 QUESTIONS 1 AND 2: 0
1. LITTLE INTEREST OR PLEASURE IN DOING THINGS: NOT AT ALL
SUM OF ALL RESPONSES TO PHQ9 QUESTIONS 1 AND 2: 0
2. FEELING DOWN, DEPRESSED OR HOPELESS: NOT AT ALL
CLINICAL INTERPRETATION OF PHQ2 SCORE: NO FURTHER SCREENING NEEDED

## 2025-01-24 ASSESSMENT — SOCIAL DETERMINANTS OF HEALTH (SDOH): IN THE PAST 12 MONTHS, HAS THE ELECTRIC, GAS, OIL, OR WATER COMPANY THREATENED TO SHUT OFF SERVICE IN YOUR HOME?: NO

## 2025-01-27 ENCOUNTER — LAB SERVICES (OUTPATIENT)
Dept: LAB | Age: 35
End: 2025-01-27

## 2025-01-27 DIAGNOSIS — Z83.2 FAMILY HISTORY OF BLOOD COAGULATION DISORDER: ICD-10-CM

## 2025-01-27 DIAGNOSIS — Z00.00 ANNUAL PHYSICAL EXAM: ICD-10-CM

## 2025-01-27 LAB
ALBUMIN SERPL-MCNC: 3.8 G/DL (ref 3.4–5)
ALBUMIN/GLOB SERPL: 1.3 {RATIO} (ref 1–2.4)
ALP SERPL-CCNC: 73 UNITS/L (ref 45–117)
ALT SERPL-CCNC: 29 UNITS/L
ANION GAP SERPL CALC-SCNC: 12 MMOL/L (ref 7–19)
AST SERPL-CCNC: 17 UNITS/L
BASOPHILS # BLD: 0 K/MCL (ref 0–0.3)
BASOPHILS NFR BLD: 1 %
BILIRUB SERPL-MCNC: 0.4 MG/DL (ref 0.2–1)
BUN SERPL-MCNC: 16 MG/DL (ref 6–20)
BUN/CREAT SERPL: 15 (ref 7–25)
CALCIUM SERPL-MCNC: 9.4 MG/DL (ref 8.4–10.2)
CHLORIDE SERPL-SCNC: 108 MMOL/L (ref 97–110)
CHOLEST SERPL-MCNC: 184 MG/DL
CHOLEST/HDLC SERPL: 4.3 {RATIO}
CO2 SERPL-SCNC: 24 MMOL/L (ref 21–32)
CREAT SERPL-MCNC: 1.05 MG/DL (ref 0.67–1.17)
CRP SERPL-MCNC: <5 MG/L
DEPRECATED RDW RBC: 38.4 FL (ref 39–50)
EGFRCR SERPLBLD CKD-EPI 2021: >90 ML/MIN/{1.73_M2}
EOSINOPHIL # BLD: 0.2 K/MCL (ref 0–0.5)
EOSINOPHIL NFR BLD: 3 %
ERYTHROCYTE [DISTWIDTH] IN BLOOD: 12.5 % (ref 11–15)
ERYTHROCYTE [SEDIMENTATION RATE] IN BLOOD BY WESTERGREN METHOD: 14 MM/HR (ref 0–20)
FASTING DURATION TIME PATIENT: 12 HOURS (ref 0–999)
GLOBULIN SER-MCNC: 3 G/DL (ref 2–4)
GLUCOSE SERPL-MCNC: 98 MG/DL (ref 70–99)
HBA1C MFR BLD: 5.1 % (ref 4.5–5.6)
HCT VFR BLD CALC: 44.8 % (ref 39–51)
HDLC SERPL-MCNC: 43 MG/DL
HGB BLD-MCNC: 14.9 G/DL (ref 13–17)
IMM GRANULOCYTES # BLD AUTO: 0 K/MCL (ref 0–0.2)
IMM GRANULOCYTES # BLD: 0 %
LDLC SERPL CALC-MCNC: 118 MG/DL
LYMPHOCYTES # BLD: 2.1 K/MCL (ref 1–4.8)
LYMPHOCYTES NFR BLD: 31 %
MCH RBC QN AUTO: 28.2 PG (ref 26–34)
MCHC RBC AUTO-ENTMCNC: 33.3 G/DL (ref 32–36.5)
MCV RBC AUTO: 84.8 FL (ref 78–100)
MONOCYTES # BLD: 0.5 K/MCL (ref 0.3–0.9)
MONOCYTES NFR BLD: 8 %
NEUTROPHILS # BLD: 3.9 K/MCL (ref 1.8–7.7)
NEUTROPHILS NFR BLD: 57 %
NONHDLC SERPL-MCNC: 141 MG/DL
NRBC BLD MANUAL-RTO: 0 /100 WBC
PLATELET # BLD AUTO: 281 K/MCL (ref 140–450)
POTASSIUM SERPL-SCNC: 4.3 MMOL/L (ref 3.4–5.1)
PROT SERPL-MCNC: 6.8 G/DL (ref 6.4–8.2)
RBC # BLD: 5.28 MIL/MCL (ref 4.5–5.9)
SODIUM SERPL-SCNC: 140 MMOL/L (ref 135–145)
TRIGL SERPL-MCNC: 116 MG/DL
TSH SERPL-ACNC: 2.18 MCUNITS/ML (ref 0.35–5)
URATE SERPL-MCNC: 5.9 MG/DL (ref 3.5–7.2)
VIT B12 SERPL-MCNC: 622 PG/ML (ref 211–911)
WBC # BLD: 6.7 K/MCL (ref 4.2–11)

## 2025-01-27 PROCEDURE — 86140 C-REACTIVE PROTEIN: CPT | Performed by: CLINICAL MEDICAL LABORATORY

## 2025-01-27 PROCEDURE — 83036 HEMOGLOBIN GLYCOSYLATED A1C: CPT | Performed by: CLINICAL MEDICAL LABORATORY

## 2025-01-27 PROCEDURE — 80050 GENERAL HEALTH PANEL: CPT | Performed by: CLINICAL MEDICAL LABORATORY

## 2025-01-27 PROCEDURE — 82607 VITAMIN B-12: CPT | Performed by: CLINICAL MEDICAL LABORATORY

## 2025-01-27 PROCEDURE — 36415 COLL VENOUS BLD VENIPUNCTURE: CPT | Performed by: INTERNAL MEDICINE

## 2025-01-27 PROCEDURE — 85307 ASSAY ACTIVATED PROTEIN C: CPT | Performed by: CLINICAL MEDICAL LABORATORY

## 2025-01-27 PROCEDURE — 80061 LIPID PANEL: CPT | Performed by: CLINICAL MEDICAL LABORATORY

## 2025-01-27 PROCEDURE — 85652 RBC SED RATE AUTOMATED: CPT | Performed by: CLINICAL MEDICAL LABORATORY

## 2025-01-27 PROCEDURE — 84550 ASSAY OF BLOOD/URIC ACID: CPT | Performed by: CLINICAL MEDICAL LABORATORY

## 2025-01-28 LAB — APCR PPP: 2.06 RATIO

## (undated) DEVICE — IODOFORM PACKING STRIP: Brand: CURITY

## (undated) DEVICE — SOLUTION SURG PREP POV IOD 7.5% 4 OZ

## (undated) DEVICE — SPONGE GZ W4XL4IN COT 12 PLY TYP VII WVN C FLD DSGN

## (undated) DEVICE — SHEET, T, LAPAROTOMY, STERILE: Brand: MEDLINE

## (undated) DEVICE — Z DISCONTINUED BY MEDLINE USE 2711682 TRAY SKIN PREP DRY W/ PREM GLV

## (undated) DEVICE — GAUZE,PACKING STRIP,IODOFORM,1/2"X5YD,ST: Brand: CURAD

## (undated) DEVICE — PACK-MAJOR

## (undated) DEVICE — NON COATED ELECTROSURGICAL NEEDLE ELECTRODE, 2.75 INCH (7 CM): Brand: MEGADYNE

## (undated) DEVICE — PREP SOL PVP IODINE 4%  4 OZ/BTL

## (undated) DEVICE — PACK PROCEDURE SURG SET UP SRMC

## (undated) DEVICE — BASIC SINGLE BASIN BTC-LF: Brand: MEDLINE INDUSTRIES, INC.

## (undated) DEVICE — SPONGE LAP W18XL18IN WHT COT 4 PLY FLD STRUNG RADPQ DISP ST

## (undated) DEVICE — GLOVE SURG SZ 7.5 L11.73IN FNGR THK9.8MIL STRW LTX POLYMER

## (undated) DEVICE — GAUZE,SPONGE,8"X4",12PLY,XRAY,STRL,LF: Brand: MEDLINE

## (undated) DEVICE — SUTURE VCRL SZ 0 L27IN ABSRB UD L36MM CT-1 1/2 CIR J260H